# Patient Record
Sex: FEMALE | Race: BLACK OR AFRICAN AMERICAN | Employment: FULL TIME | URBAN - METROPOLITAN AREA
[De-identification: names, ages, dates, MRNs, and addresses within clinical notes are randomized per-mention and may not be internally consistent; named-entity substitution may affect disease eponyms.]

---

## 2017-10-09 ENCOUNTER — OFFICE VISIT (OUTPATIENT)
Dept: PRIMARY CARE CLINIC | Age: 22
End: 2017-10-09

## 2017-10-09 VITALS
WEIGHT: 158 LBS | HEIGHT: 68 IN | SYSTOLIC BLOOD PRESSURE: 109 MMHG | HEART RATE: 68 BPM | BODY MASS INDEX: 23.95 KG/M2 | OXYGEN SATURATION: 99 % | DIASTOLIC BLOOD PRESSURE: 72 MMHG | TEMPERATURE: 97.5 F

## 2017-10-09 DIAGNOSIS — F41.9 ANXIETY: ICD-10-CM

## 2017-10-09 DIAGNOSIS — Z00.00 ANNUAL PHYSICAL EXAM: Primary | ICD-10-CM

## 2017-10-09 DIAGNOSIS — Z30.09 FAMILY PLANNING: ICD-10-CM

## 2017-10-09 DIAGNOSIS — Z11.4 SCREENING FOR HIV WITHOUT PRESENCE OF RISK FACTORS: ICD-10-CM

## 2017-10-09 DIAGNOSIS — L30.9 ECZEMA, UNSPECIFIED TYPE: ICD-10-CM

## 2017-10-09 PROCEDURE — 99395 PREV VISIT EST AGE 18-39: CPT | Performed by: FAMILY MEDICINE

## 2017-10-09 RX ORDER — TRIAMCINOLONE ACETONIDE 1 MG/G
CREAM TOPICAL
Qty: 45 G | Refills: 1 | Status: SHIPPED | OUTPATIENT
Start: 2017-10-09 | End: 2018-04-23

## 2017-10-09 RX ORDER — TRIAMCINOLONE ACETONIDE 5 MG/G
CREAM TOPICAL
COMMUNITY
Start: 2015-06-24 | End: 2017-10-09 | Stop reason: SDUPTHER

## 2017-10-09 ASSESSMENT — PATIENT HEALTH QUESTIONNAIRE - PHQ9
SUM OF ALL RESPONSES TO PHQ9 QUESTIONS 1 & 2: 2
2. FEELING DOWN, DEPRESSED OR HOPELESS: 1
SUM OF ALL RESPONSES TO PHQ QUESTIONS 1-9: 2
1. LITTLE INTEREST OR PLEASURE IN DOING THINGS: 1

## 2017-10-09 NOTE — PROGRESS NOTES
Social History     Social History    Marital status: Single     Spouse name: N/A    Number of children: N/A    Years of education: N/A     Occupational History    Not on file. Social History Main Topics    Smoking status: Former Smoker    Smokeless tobacco: Never Used    Alcohol use 0.0 oz/week      Comment: rarely    Drug use: No    Sexual activity: Yes     Partners: Male     Other Topics Concern    Not on file     Social History Narrative    No narrative on file       Review of Systems:  A comprehensive review of systems was negative except for what was noted in the HPI. Physical Exam:   Vitals:    10/09/17 1059   BP: 109/72   Pulse: 68   Temp: 97.5 °F (36.4 °C)   TempSrc: Oral   SpO2: 99%   Weight: 158 lb (71.7 kg)   Height: 5' 7.5\" (1.715 m)     Body mass index is 24.38 kg/m². Constitutional: She is oriented to person, place, and time. She appears well-developed and well-nourished. No distress. HEENT:   Head: Normocephalic and atraumatic. Right Ear: Tympanic membrane, external ear and ear canal normal.   Left Ear: Tympanic membrane, external ear and ear canal normal.   Nose: Nose normal.   Mouth/Throat: Oropharynx is clear and moist, and mucous membranes are normal.  There is no cervical adenopathy. Eyes: Conjunctivae and extraocular motions are normal. Pupils are equal, round, and reactive to light. Neck: Neck supple. No JVD present. Carotid bruit is not present. No mass and no thyromegaly present. Cardiovascular: Normal rate, regular rhythm, normal heart sounds and intact distal pulses. Exam reveals no gallop and no friction rub. No murmur heard. Pulmonary/Chest: Effort normal and breath sounds normal. No respiratory distress. She has no wheezes, rhonchi or rales. Abdominal: Soft, non-tender. Bowel sounds and aorta are normal. She exhibits no organomegaly, mass or bruit.    Genitourinary:per gyn  Rectal: per gyn  Breast exam:per gyn  Musculoskeletal: Normal range of motion, no synovitis. She exhibits no edema. Neurological: She is alert and oriented to person, place, and time. She has normal reflexes. No cranial nerve deficit. Coordination normal.   Skin: Skin is warm and dry. Few patches rash posterior neck areas , none anti cubital areas    Psychiatric: She has a normal mood and affect. Her speech is normal and behavior is normal. Judgment, cognition and memory are normal.       EKG Interpretation:     Lab Review: not applicable     Assessment/Plan:    Leonora was seen today for annual exam, anxiety and other. Diagnoses and all orders for this visit:    Annual physical exam    Eczema, unspecified type  restart  -     triamcinolone (KENALOG) 0.1 % cream; Apply topically 2 times daily. -     Comprehensive Metabolic Panel  -     CBC Auto Differential; Future    Anxiety wants refer for counseling  Will ck with ins for panel and ref  Ck labs  -     TSH without Reflex  -     Comprehensive Metabolic Panel  -     CBC Auto Differential; Future    Screening for HIV without presence of risk factors  -     HIV Screen;  Future    Family planning    amb ref to gyn, wants bcp

## 2018-01-15 ENCOUNTER — OFFICE VISIT (OUTPATIENT)
Dept: PRIMARY CARE CLINIC | Age: 23
End: 2018-01-15

## 2018-01-15 VITALS
OXYGEN SATURATION: 98 % | BODY MASS INDEX: 25.43 KG/M2 | HEIGHT: 67 IN | WEIGHT: 162 LBS | SYSTOLIC BLOOD PRESSURE: 119 MMHG | HEART RATE: 96 BPM | TEMPERATURE: 97.1 F | DIASTOLIC BLOOD PRESSURE: 75 MMHG

## 2018-01-15 DIAGNOSIS — R05.8 PRODUCTIVE COUGH: Primary | ICD-10-CM

## 2018-01-15 DIAGNOSIS — R68.89 FLU-LIKE SYMPTOMS: ICD-10-CM

## 2018-01-15 LAB
INFLUENZA A ANTIBODY: NORMAL
INFLUENZA B ANTIBODY: NORMAL

## 2018-01-15 PROCEDURE — 99214 OFFICE O/P EST MOD 30 MIN: CPT | Performed by: FAMILY MEDICINE

## 2018-01-15 PROCEDURE — 87804 INFLUENZA ASSAY W/OPTIC: CPT | Performed by: FAMILY MEDICINE

## 2018-01-15 RX ORDER — GUAIFENESIN/DEXTROMETHORPHAN 100-10MG/5
5 SYRUP ORAL 3 TIMES DAILY PRN
Qty: 120 ML | Refills: 0 | Status: SHIPPED | OUTPATIENT
Start: 2018-01-15 | End: 2018-01-25

## 2018-01-15 RX ORDER — AZITHROMYCIN 250 MG/1
TABLET, FILM COATED ORAL
Qty: 1 PACKET | Refills: 0 | Status: SHIPPED | OUTPATIENT
Start: 2018-01-15 | End: 2018-01-25

## 2018-01-15 ASSESSMENT — ENCOUNTER SYMPTOMS
SINUS PRESSURE: 0
CHEST TIGHTNESS: 0
RECTAL PAIN: 0
SWOLLEN GLANDS: 0
TROUBLE SWALLOWING: 0
COUGH: 1
EYE DISCHARGE: 0
BLOOD IN STOOL: 0
EYE REDNESS: 0
NAUSEA: 0
VOMITING: 0
RHINORRHEA: 1
APNEA: 0
ABDOMINAL DISTENTION: 0
PHOTOPHOBIA: 0
FLU SYMPTOMS: 1
CHOKING: 0
BACK PAIN: 0
DIARRHEA: 0
WHEEZING: 1
STRIDOR: 0
EYE PAIN: 0
SORE THROAT: 1
EYE ITCHING: 0
HEMOPTYSIS: 0
COLOR CHANGE: 0
CONSTIPATION: 0
SHORTNESS OF BREATH: 0
VISUAL CHANGE: 0

## 2018-04-23 ENCOUNTER — OFFICE VISIT (OUTPATIENT)
Dept: PRIMARY CARE CLINIC | Age: 23
End: 2018-04-23

## 2018-04-23 VITALS
SYSTOLIC BLOOD PRESSURE: 108 MMHG | OXYGEN SATURATION: 99 % | TEMPERATURE: 97.8 F | HEIGHT: 67 IN | WEIGHT: 153 LBS | DIASTOLIC BLOOD PRESSURE: 66 MMHG | HEART RATE: 85 BPM | BODY MASS INDEX: 24.01 KG/M2

## 2018-04-23 DIAGNOSIS — F32.2 SEVERE SINGLE CURRENT EPISODE OF MAJOR DEPRESSIVE DISORDER, WITHOUT PSYCHOTIC FEATURES (HCC): ICD-10-CM

## 2018-04-23 DIAGNOSIS — R63.4 WEIGHT LOSS: Primary | ICD-10-CM

## 2018-04-23 LAB
AMPHETAMINE SCREEN, URINE: ABNORMAL
BARBITURATE SCREEN, URINE: ABNORMAL
BENZODIAZEPINE SCREEN, URINE: ABNORMAL
COCAINE METABOLITE SCREEN URINE: ABNORMAL
MDMA URINE: ABNORMAL
METHADONE SCREEN, URINE: ABNORMAL
METHAMPHETAMINE, URINE: ABNORMAL
OPIATE SCREEN URINE: ABNORMAL
OXYCODONE SCREEN URINE: ABNORMAL
PHENCYCLIDINE SCREEN URINE: ABNORMAL
PROPOXYPHENE SCREEN, URINE: ABNORMAL
THC: POSITIVE
TRICYCLIC ANTIDEPRESSANTS, UR: ABNORMAL

## 2018-04-23 PROCEDURE — 80305 DRUG TEST PRSMV DIR OPT OBS: CPT | Performed by: FAMILY MEDICINE

## 2018-04-23 PROCEDURE — 99214 OFFICE O/P EST MOD 30 MIN: CPT | Performed by: FAMILY MEDICINE

## 2018-04-23 RX ORDER — TRAZODONE HYDROCHLORIDE 50 MG/1
50 TABLET ORAL NIGHTLY
Qty: 30 TABLET | Refills: 3 | Status: SHIPPED | OUTPATIENT
Start: 2018-04-23 | End: 2018-08-31 | Stop reason: SDUPTHER

## 2018-04-23 RX ORDER — ESCITALOPRAM OXALATE 10 MG/1
10 TABLET ORAL DAILY
Qty: 30 TABLET | Refills: 3 | Status: SHIPPED | OUTPATIENT
Start: 2018-04-23 | End: 2018-05-11 | Stop reason: DRUGHIGH

## 2018-04-23 RX ORDER — ZOLPIDEM TARTRATE 5 MG/1
5 TABLET ORAL NIGHTLY PRN
Qty: 14 TABLET | Refills: 0 | Status: SHIPPED | OUTPATIENT
Start: 2018-04-23 | End: 2018-04-23 | Stop reason: CLARIF

## 2018-04-23 ASSESSMENT — ENCOUNTER SYMPTOMS
RECTAL PAIN: 0
CONSTIPATION: 0
COUGH: 0
EYE ITCHING: 0
SORE THROAT: 0
CHOKING: 0
BACK PAIN: 0
ABDOMINAL DISTENTION: 0
APNEA: 0
SHORTNESS OF BREATH: 0
EYE PAIN: 0
WHEEZING: 0
DIARRHEA: 0
CHEST TIGHTNESS: 0
COLOR CHANGE: 0
RHINORRHEA: 0
EYE REDNESS: 0
BLOOD IN STOOL: 0
SINUS PRESSURE: 0
EYE DISCHARGE: 0
TROUBLE SWALLOWING: 0
NAUSEA: 0
VOMITING: 0
STRIDOR: 0
PHOTOPHOBIA: 0

## 2018-05-11 ENCOUNTER — TELEPHONE (OUTPATIENT)
Dept: PRIMARY CARE CLINIC | Age: 23
End: 2018-05-11

## 2018-05-11 DIAGNOSIS — F32.2 SEVERE SINGLE CURRENT EPISODE OF MAJOR DEPRESSIVE DISORDER, WITHOUT PSYCHOTIC FEATURES (HCC): Primary | ICD-10-CM

## 2018-05-11 RX ORDER — ESCITALOPRAM OXALATE 20 MG/1
20 TABLET ORAL DAILY
Qty: 30 TABLET | Refills: 3 | Status: SHIPPED | OUTPATIENT
Start: 2018-05-11 | End: 2018-07-06 | Stop reason: SDUPTHER

## 2018-07-06 ENCOUNTER — OFFICE VISIT (OUTPATIENT)
Dept: PSYCHIATRY | Age: 23
End: 2018-07-06

## 2018-07-06 VITALS
RESPIRATION RATE: 18 BRPM | DIASTOLIC BLOOD PRESSURE: 64 MMHG | WEIGHT: 152.4 LBS | HEIGHT: 67 IN | SYSTOLIC BLOOD PRESSURE: 103 MMHG | HEART RATE: 77 BPM | BODY MASS INDEX: 23.92 KG/M2 | OXYGEN SATURATION: 98 %

## 2018-07-06 DIAGNOSIS — F32.2 SEVERE SINGLE CURRENT EPISODE OF MAJOR DEPRESSIVE DISORDER, WITHOUT PSYCHOTIC FEATURES (HCC): ICD-10-CM

## 2018-07-06 DIAGNOSIS — R63.4 WEIGHT LOSS: ICD-10-CM

## 2018-07-06 DIAGNOSIS — F39 MOOD DISORDER (HCC): ICD-10-CM

## 2018-07-06 DIAGNOSIS — F39 MOOD DISORDER (HCC): Primary | ICD-10-CM

## 2018-07-06 DIAGNOSIS — F41.1 GAD (GENERALIZED ANXIETY DISORDER): ICD-10-CM

## 2018-07-06 LAB
A/G RATIO: 1.4 (ref 1.1–2.2)
ALBUMIN SERPL-MCNC: 4.3 G/DL (ref 3.4–5)
ALP BLD-CCNC: 54 U/L (ref 40–129)
ALT SERPL-CCNC: 15 U/L (ref 10–40)
ANION GAP SERPL CALCULATED.3IONS-SCNC: 12 MMOL/L (ref 3–16)
AST SERPL-CCNC: 16 U/L (ref 15–37)
BASOPHILS ABSOLUTE: 0 K/UL (ref 0–0.2)
BASOPHILS RELATIVE PERCENT: 0.4 %
BILIRUB SERPL-MCNC: 0.3 MG/DL (ref 0–1)
BILIRUBIN URINE: NEGATIVE
BLOOD, URINE: NEGATIVE
BUN BLDV-MCNC: 11 MG/DL (ref 7–20)
CALCIUM SERPL-MCNC: 9.5 MG/DL (ref 8.3–10.6)
CHLORIDE BLD-SCNC: 103 MMOL/L (ref 99–110)
CHOLESTEROL, TOTAL: 140 MG/DL (ref 0–199)
CLARITY: CLEAR
CO2: 26 MMOL/L (ref 21–32)
COLOR: NORMAL
CREAT SERPL-MCNC: 0.6 MG/DL (ref 0.6–1.1)
EOSINOPHILS ABSOLUTE: 0.2 K/UL (ref 0–0.6)
EOSINOPHILS RELATIVE PERCENT: 3.2 %
GFR AFRICAN AMERICAN: >60
GFR NON-AFRICAN AMERICAN: >60
GLOBULIN: 3 G/DL
GLUCOSE BLD-MCNC: 80 MG/DL (ref 70–99)
GLUCOSE URINE: NEGATIVE MG/DL
HCT VFR BLD CALC: 38.9 % (ref 36–48)
HDLC SERPL-MCNC: 65 MG/DL (ref 40–60)
HEMOGLOBIN: 13.5 G/DL (ref 12–16)
KETONES, URINE: NEGATIVE MG/DL
LDL CHOLESTEROL CALCULATED: 66 MG/DL
LEUKOCYTE ESTERASE, URINE: NEGATIVE
LYMPHOCYTES ABSOLUTE: 1.6 K/UL (ref 1–5.1)
LYMPHOCYTES RELATIVE PERCENT: 31.3 %
MCH RBC QN AUTO: 30.1 PG (ref 26–34)
MCHC RBC AUTO-ENTMCNC: 34.6 G/DL (ref 31–36)
MCV RBC AUTO: 86.8 FL (ref 80–100)
MICROSCOPIC EXAMINATION: NORMAL
MONOCYTES ABSOLUTE: 0.4 K/UL (ref 0–1.3)
MONOCYTES RELATIVE PERCENT: 8.4 %
NEUTROPHILS ABSOLUTE: 2.9 K/UL (ref 1.7–7.7)
NEUTROPHILS RELATIVE PERCENT: 56.7 %
NITRITE, URINE: NEGATIVE
PDW BLD-RTO: 13.4 % (ref 12.4–15.4)
PH UA: 6
PLATELET # BLD: 218 K/UL (ref 135–450)
PMV BLD AUTO: 8 FL (ref 5–10.5)
POTASSIUM SERPL-SCNC: 4.6 MMOL/L (ref 3.5–5.1)
PREALBUMIN: 22.4 MG/DL (ref 20–40)
PROTEIN UA: NEGATIVE MG/DL
RBC # BLD: 4.48 M/UL (ref 4–5.2)
SODIUM BLD-SCNC: 141 MMOL/L (ref 136–145)
SPECIFIC GRAVITY UA: 1.02
TOTAL PROTEIN: 7.3 G/DL (ref 6.4–8.2)
TRIGL SERPL-MCNC: 46 MG/DL (ref 0–150)
TSH REFLEX FT4: 0.93 UIU/ML (ref 0.27–4.2)
TSH SERPL DL<=0.05 MIU/L-ACNC: 0.86 UIU/ML (ref 0.27–4.2)
URINE TYPE: NORMAL
UROBILINOGEN, URINE: 0.2 E.U./DL
VITAMIN D 25-HYDROXY: 22.2 NG/ML
VLDLC SERPL CALC-MCNC: 9 MG/DL
WBC # BLD: 5.1 K/UL (ref 4–11)

## 2018-07-06 PROCEDURE — 99204 OFFICE O/P NEW MOD 45 MIN: CPT | Performed by: NURSE PRACTITIONER

## 2018-07-06 RX ORDER — ARIPIPRAZOLE 5 MG/1
5 TABLET ORAL DAILY
Qty: 30 TABLET | Refills: 1 | Status: SHIPPED | OUTPATIENT
Start: 2018-07-06 | End: 2018-08-17

## 2018-07-06 RX ORDER — HYDROXYZINE HYDROCHLORIDE 25 MG/1
25 TABLET, FILM COATED ORAL 3 TIMES DAILY PRN
Qty: 60 TABLET | Refills: 2 | Status: SHIPPED | OUTPATIENT
Start: 2018-07-06 | End: 2018-08-05

## 2018-07-06 RX ORDER — ESCITALOPRAM OXALATE 20 MG/1
20 TABLET ORAL DAILY
Qty: 30 TABLET | Refills: 3 | Status: SHIPPED | OUTPATIENT
Start: 2018-07-06 | End: 2021-04-23 | Stop reason: ALTCHOICE

## 2018-07-06 ASSESSMENT — PATIENT HEALTH QUESTIONNAIRE - PHQ9
SUM OF ALL RESPONSES TO PHQ9 QUESTIONS 1 & 2: 4
1. LITTLE INTEREST OR PLEASURE IN DOING THINGS: 2
10. IF YOU CHECKED OFF ANY PROBLEMS, HOW DIFFICULT HAVE THESE PROBLEMS MADE IT FOR YOU TO DO YOUR WORK, TAKE CARE OF THINGS AT HOME, OR GET ALONG WITH OTHER PEOPLE: 3
8. MOVING OR SPEAKING SO SLOWLY THAT OTHER PEOPLE COULD HAVE NOTICED. OR THE OPPOSITE, BEING SO FIGETY OR RESTLESS THAT YOU HAVE BEEN MOVING AROUND A LOT MORE THAN USUAL: 0
4. FEELING TIRED OR HAVING LITTLE ENERGY: 3
9. THOUGHTS THAT YOU WOULD BE BETTER OFF DEAD, OR OF HURTING YOURSELF: 0
2. FEELING DOWN, DEPRESSED OR HOPELESS: 2
6. FEELING BAD ABOUT YOURSELF - OR THAT YOU ARE A FAILURE OR HAVE LET YOURSELF OR YOUR FAMILY DOWN: 2
7. TROUBLE CONCENTRATING ON THINGS, SUCH AS READING THE NEWSPAPER OR WATCHING TELEVISION: 2
SUM OF ALL RESPONSES TO PHQ QUESTIONS 1-9: 15
5. POOR APPETITE OR OVEREATING: 3
3. TROUBLE FALLING OR STAYING ASLEEP: 1

## 2018-07-06 ASSESSMENT — ANXIETY QUESTIONNAIRES
1. FEELING NERVOUS, ANXIOUS, OR ON EDGE: 3-NEARLY EVERY DAY
7. FEELING AFRAID AS IF SOMETHING AWFUL MIGHT HAPPEN: 3-NEARLY EVERY DAY
2. NOT BEING ABLE TO STOP OR CONTROL WORRYING: 3-NEARLY EVERY DAY
4. TROUBLE RELAXING: 3-NEARLY EVERY DAY
6. BECOMING EASILY ANNOYED OR IRRITABLE: 3-NEARLY EVERY DAY
GAD7 TOTAL SCORE: 19
5. BEING SO RESTLESS THAT IT IS HARD TO SIT STILL: 1-SEVERAL DAYS
3. WORRYING TOO MUCH ABOUT DIFFERENT THINGS: 3-NEARLY EVERY DAY

## 2018-07-06 NOTE — PROGRESS NOTES
PSYCHIATRY INITIAL EVALUATION/DIAGNOSTIC ASSESSMENT    Svetlana Hoover  1995 07/06/18    Face to Face time:90 mins  CC:   Chief Complaint   Patient presents with    Depression     REFERRED BY DR Mahsa Sultana       HPI:   Svetlana Hoover is a 21 y.o. female with h/o mood lability, anxiety, panic and ocd sx who p/t clinic to establish care with this provider. Referred by Dennise Shell MD.     Struggled with mood lability and anxiety \"my whole life\"  Began to have negative impact on daily functioning 2 years ago. Triggered by multiple deaths in the family. One of those being grandmother who was positive influence in life. Saw her more as mother and \"best friend\". Watched her die of cancer, helped care for her in final days. \"took a toll on me. \"  One of best friends was shot/killed by police office while leaving party patient was supposed to have attended. Additional stressors include first real relationship that he ended 1/2018, losing virginity at 24; angry at self for this, had planned to wait until ; Work stress, training to become manager. Felt racially profiled. Struggling with being , struggles to know where to fit in. Conflict with mom who has own poorly controlled mental health issues and not handling multiple family deaths well. No prior hospitalizations or suicide attempts. PCP started lexapro. Has noted partial response.   Still feels has \"no control over my emotions\" and \"always worrying\"    Psych ROS:     Depression: decreased sleep (was waking every 2 hours, improved with trazodone), change in appetite (variable, lost 70# in past few years), decreased concentration, guilt, hopelessness, helplessness, poor self esteem, difficulty with ADL completion, loss of interest, poor energy, tearful, increased isolation,     Elizabeth: intermittent episodes lasting approx 24 hours:  insomnia with increased energy, rapid speech, irritability,  expansive mood, increase in energy and goal directed behavior,     Anxiety: constant worry \"everything\", (work, family, accidents, what people think about her, is \"people pleaser\"), irritability, sleep disruption, somatic complaints (headaches, trembling, nausea, dyspnea, diaphoreis, muscle tension), restlessness, fatigue; fear of doing/saying wrong things, fear of judgement, avoidant of social situations    OCD:  Repetitive actions or rituals, contamination fears, need for symmetry,  mental or verbal repetition of words or phrases and counting steps    Panic:  Intermittent episdoes Shortness of breath, diaphoresis, hot flashes, crying; always triggered by precipitating stressful event, can last hour    Psychosis: paranoia related to anxiety, thinks people want to have negative impact on her or dislike her; DENIES A/VH, delusions      PTSD: (r/t caring for dying grandmother):  nightmares, flashbacks, reliving the event,     Eating disorders:  Denies prior dx. Decided to focus on health in 2014 d/t grandmother's cancer diagnosis. Lost 70# over past few years, stopped caring about many things after grandmother's death, often would only eat once/day. Highest weight 236# 2013; lowest 145#. Feels best around 150#. No longer focused on weight but doesn't want to gain weight. Not actively attempting weight loss. Eats inconsistently. Was weighing self daily, no longer. Has \"good\" body image now. Denies purging, laxatives, diuretics or diet pills. Denies excessive exercise          MICHAEL 7 SCORE 7/6/2018   MICHAEL-7 Total Score 19     Interpretation of MICHAEL-7 score: 5-9 = mild anxiety, 10-14 = moderate anxiety, 15+ = severe anxiety. Recommend referral to behavioral health for scores 10 or greater.     PHQ-9 Total Score: 15 (7/6/2018  1:38 PM)  Interpretation of PHQ-9 score:  1-4 = minimal depression, 5-9 = mild depression, 10-14 = moderate depression; 15-19 = moderately severe depression, 20-27 = severe depression    History obtained from patient and chart denies completion; mom attempted suicide when pt 15years old    Allergies: No Known Allergies      Current Medications:     Current Outpatient Prescriptions on File Prior to Visit   Medication Sig Dispense Refill    traZODone (DESYREL) 50 MG tablet Take 1 tablet by mouth nightly 30 tablet 3     No current facility-administered medications on file prior to visit. Controlled substances monitoring: none on file. OBJECTIVE:  Vitals:   Vitals:    07/06/18 1334   BP: 103/64   Pulse: 77   Resp: 18   SpO2: 98%     Wt Readings from Last 3 Encounters:   07/06/18 152 lb 6.4 oz (69.1 kg)   04/23/18 153 lb (69.4 kg)   01/15/18 162 lb (73.5 kg)           ROS: Denies trouble with fever, rash, headache, vision changes, chest pain, shortness of breath, nausea, extremity pain, weakness, dysuria.      Mental Status Exam:     Appearance    alert, cooperative, appropriate dress for season, well groomed, appears stated age  Muscle strength/tone: no atrophy or abnormal movements  Gait/station: normal  Speech    spontaneous, normal rate and normal volume  Mood    Anxious  Depressed  Shame  Humiliation  Irritability  Emptiness  Demoralization  Affect    guarded Congruent to thought content and mood  Thought Content    hopelessness, excessive preoccupations, obsessions, intrusive thoughts and cognitive distortions, no delusions voiced  Thought Process    coherent   Associations    logical connections  Perceptions: denies AH/VH, does not appear preoccupied with the internal environment  [address]  Orientation    oriented to person, place, time, and general circumstances  Memory    recent and remote memory intact  Attention/Concentration    intact  Ability to understand instructions Yes  Ability to respond meaningfully Yes  Language: [address] of knowledge/Intellect: Average  SI:   no suicidal ideation  HI: Denies HI    Labs:   Lab Review   Office Visit on 04/23/2018   Component Date Value    psychosis or cognitive dysfunction, physically healthy, compliant with recommended medications,and patient is future oriented. 2. Psychiatric  - MDD vs BAD vs adjustment d/o. Endorses some concerning sx for possible BAD and similar symptoms in mother but undiagnosed. Complicated grief following deaths of grandmother and best friend, never truly grieved or had closure. - continue lexapro 20mg/day for mood/anxiety/ocds. Notes partial response. Consider increase dose vs switch to different ssri  - augment with abilify for mood stability/anxiety/ocd sx. Start 2.5mg/day x 4 days, then increase to 5mg/day  - continue trazodone 50mg at hs for insomnia  - trial  Hydroxyzine 12.5-25mg tid prn anxiety  -Labs: reviewed in Epic, S + [facility] 4/2018; needs updated, order cbc, cmp, vit d, tsh, lipids, hgba1c (starting SGA); says not currently sexually active, no concerns for pregnancy  -recommend outpt therapy. Resources given as below  -OARRS reviewed, c/w history  -R/b/se/a d/w pt who consents. 3. Medical  -Following with Johnny Dowling MD    4. Substance   - THC 5 times/week x 3 years, low motivation to stop.  counseled    5. RTC -4  weeks    Noemy Blackburn  Psychiatric Nurse Practitioner     Counseling Services    [facility] Counseling Service   [phone] (toll free)  [phone]   [address] not accepted. Locations: [place]: [facility], [address], [place], [address]: [address], [facility], [address]Th [address], [address], [place], [address] [facility]: [address] [place], [place], [address] [facility]  [place]: [address], [place], [facility] HOSPITAL:  17[address] [facility], [address] [address]:  101 [facility] [address] [place] [address] Counseling Services  Phone: [phone]  Locations:   [facility]: [place]  [address], [facility] [address]: 10 [facility], [facility], [address]  [place]: [address] [facility]. [address], [place], [address] (adult and adolescent; substance use, dialectical behavior therapy)   HOSP [facility], [place], operated by [facility] [address] MADONNA BROWN, [facility], [place] [address], [facility]   [phone]  www. [facility]  [employer] (including Medicare but not Medicaid) and self-pay    Connected with: [facility]- PSYCHIATRY & ADDICTIVE MED  [facility]0 [address]   [phone]  [email or web address]           [address]  [phone] 281-[phone] ([phone])    National Suicide Prevention Lifeline  (268) 713-TALK (1300)  www.suicidepreventionlifeline. [facility] Pupa of [address] ([place]): [address]  [address]      Text Support  Text 199491 \"connect\" if suicidal for contact via text or phone call

## 2018-07-06 NOTE — PATIENT INSTRUCTIONS
1.  Take abilify 5mg 1/2 tab by mouth for 4 days, then increase to whole tablet daily  2. Continue lexapro 20mg/day  3. Continue trazodone 50mg at bedtime as needed for sleep  4. Try hydroxyzine 1/2 to 1 tab three times daily as needed for anxiety      Counseling Services    [facility] Counseling Service   [phone] (toll free)  [address] [facility] not accepted. Locations: [place]: [facility], [address], [place], [address]: [facility] [facility], [facility], [address]Th [address], [address], [place], [address] [facility] [facility]  [address]: 20 [facility]. [address], [facility], 83 [facility]  [address]: [address], [place], [facility]:  17[address] [facility], [address] [facility] [address]:  101 [facility] [address] [place] [address] Counseling Services  Phone: [phone]  Locations:   [place]: [facility]. [address], [address] [facility] [address] [facility] [address]: 10 [facility], [address]  [address] [address]: [id number] [place], [place] [address]andreina Northd , Sonja [address]le[address]. [address], [address] [facility] [address], [address]    [address] (adult and adolescent; substance use, dialectical behavior therapy)   Jenny Pichardo Jodine Rakers, [facility]   [phone]  [email or web address]  [employer] (including Medicare but not Medicaid) and self-pay    Connected with: The [facility]- PSYCHIATRY & ADDICTIVE MED  [address] [facility] [address] [facility] [address]   [phone]  [email or web address]           [address]  [phone] 281-CARE ([phone])    National Suicide Prevention Lifeline  (483) 312-TALK (8000)  www.suicidepreventionlifeline. Elham Quiroz of [address] [place] [address]): [address]  [address]      Text Support  Text 250228 \"connect\" if suicidal for contact via text or phone call

## 2018-07-07 LAB
ESTIMATED AVERAGE GLUCOSE: 93.9 MG/DL
HBA1C MFR BLD: 4.9 %

## 2018-08-17 ENCOUNTER — OFFICE VISIT (OUTPATIENT)
Dept: PSYCHIATRY | Age: 23
End: 2018-08-17

## 2018-08-17 VITALS
SYSTOLIC BLOOD PRESSURE: 108 MMHG | DIASTOLIC BLOOD PRESSURE: 64 MMHG | HEART RATE: 69 BPM | BODY MASS INDEX: 24.81 KG/M2 | WEIGHT: 158.4 LBS

## 2018-08-17 DIAGNOSIS — F41.1 GAD (GENERALIZED ANXIETY DISORDER): Primary | ICD-10-CM

## 2018-08-17 DIAGNOSIS — F39 MOOD DISORDER (HCC): ICD-10-CM

## 2018-08-17 PROCEDURE — 99214 OFFICE O/P EST MOD 30 MIN: CPT | Performed by: NURSE PRACTITIONER

## 2018-08-17 RX ORDER — HYDROXYZINE HYDROCHLORIDE 25 MG/1
25 TABLET, FILM COATED ORAL 3 TIMES DAILY PRN
COMMUNITY
End: 2021-04-23

## 2018-08-17 RX ORDER — LORAZEPAM 0.5 MG/1
0.5 TABLET ORAL 2 TIMES DAILY PRN
Qty: 60 TABLET | Refills: 0 | Status: SHIPPED | OUTPATIENT
Start: 2018-08-17 | End: 2018-09-16

## 2018-08-17 RX ORDER — QUETIAPINE FUMARATE 50 MG/1
50 TABLET, FILM COATED ORAL NIGHTLY
Qty: 30 TABLET | Refills: 1 | Status: SHIPPED | OUTPATIENT
Start: 2018-08-17 | End: 2021-04-23 | Stop reason: ALTCHOICE

## 2018-08-17 ASSESSMENT — PATIENT HEALTH QUESTIONNAIRE - PHQ9
8. MOVING OR SPEAKING SO SLOWLY THAT OTHER PEOPLE COULD HAVE NOTICED. OR THE OPPOSITE, BEING SO FIGETY OR RESTLESS THAT YOU HAVE BEEN MOVING AROUND A LOT MORE THAN USUAL: 3
4. FEELING TIRED OR HAVING LITTLE ENERGY: 3
3. TROUBLE FALLING OR STAYING ASLEEP: 0
SUM OF ALL RESPONSES TO PHQ QUESTIONS 1-9: 16
SUM OF ALL RESPONSES TO PHQ9 QUESTIONS 1 & 2: 3
7. TROUBLE CONCENTRATING ON THINGS, SUCH AS READING THE NEWSPAPER OR WATCHING TELEVISION: 3
10. IF YOU CHECKED OFF ANY PROBLEMS, HOW DIFFICULT HAVE THESE PROBLEMS MADE IT FOR YOU TO DO YOUR WORK, TAKE CARE OF THINGS AT HOME, OR GET ALONG WITH OTHER PEOPLE: 2
SUM OF ALL RESPONSES TO PHQ QUESTIONS 1-9: 16
2. FEELING DOWN, DEPRESSED OR HOPELESS: 1
6. FEELING BAD ABOUT YOURSELF - OR THAT YOU ARE A FAILURE OR HAVE LET YOURSELF OR YOUR FAMILY DOWN: 1
1. LITTLE INTEREST OR PLEASURE IN DOING THINGS: 2
9. THOUGHTS THAT YOU WOULD BE BETTER OFF DEAD, OR OF HURTING YOURSELF: 0
5. POOR APPETITE OR OVEREATING: 3

## 2018-08-17 ASSESSMENT — ANXIETY QUESTIONNAIRES
3. WORRYING TOO MUCH ABOUT DIFFERENT THINGS: 3-NEARLY EVERY DAY
1. FEELING NERVOUS, ANXIOUS, OR ON EDGE: 3-NEARLY EVERY DAY
2. NOT BEING ABLE TO STOP OR CONTROL WORRYING: 3-NEARLY EVERY DAY
6. BECOMING EASILY ANNOYED OR IRRITABLE: 3-NEARLY EVERY DAY
5. BEING SO RESTLESS THAT IT IS HARD TO SIT STILL: 3-NEARLY EVERY DAY
7. FEELING AFRAID AS IF SOMETHING AWFUL MIGHT HAPPEN: 3-NEARLY EVERY DAY
GAD7 TOTAL SCORE: 21
4. TROUBLE RELAXING: 3-NEARLY EVERY DAY

## 2018-08-17 NOTE — PROGRESS NOTES
PSYCHIATRY PROGRESS NOTE    Serafin Warren  1995 08/17/18    Face to Face time:35 mins  CC:   Chief Complaint   Patient presents with    1 Month Follow-Up     Struggled with mood lability and anxiety \"my whole life\"  Began to have negative impact on daily functioning 2 years ago. Triggered by multiple deaths in the family. HPI:   Serafin Warren is a 21 y.o. female with h/o mood lability, anxiety, panic and ocd sx who p/t clinic for follow up. Since initial eval 7/6/18, took abilify for 3-4 weeks. Made irritability and anxiety worse. Stopped taking 2 weeks ago. No change in sx    Stressors: Moved into new apartment by self. 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 Does note feeling happier, more motivated and able to get things done but frequent mood swings. Irritability. Increased since abilify. Feels rage, particularly when driving. HI thoughts, could never act on it. Denies target, plan or weapons. Sleep struggle. Back to waking every hour or so. Low energy. Wakes feeling anxious. Forgetful, loses things. Worse if poor sleep night before. Mood somewhat improved, eating more consistent meals. Isolative. Hard to get through work. Min effort in ADL completion, often skips brushing teeth    Anxiety: worse. Constant mind racing. Unable to relax. Some days avoidant, some days wants to be social.  [place] April' tolerate hydroxyzine at work, makes too tired. No major improvement with anxiety. + acid reflux and clenches jaw (not r/t prior abilify, is r/t anxiety), jittery, thinks all r/t anxiety    Couple panic attacks    Safety:  Denies sh/si/avh.  + hi without plan/intent/target. Says \"could never act on it\". Mostly when driving      Substance use:  etoh every 2 weeks, 4 shots; THC 4-5 times/week to relax. Denies tobacco    Thearpy:  Not yet started. Was busy with recent move.   Plans to try to schedule this substances monitoring: OARRS report reviewed today- no controlled subs on file. OBJECTIVE:  Vitals:    Wt Readings from Last 3 Encounters:   08/17/18 158 lb 6.4 oz (71.8 kg)   07/06/18 152 lb 6.4 oz (69.1 kg)   04/23/18 153 lb (69.4 kg)       Vitals:    08/17/18 1008   BP: 108/64   Pulse: 69   Weight: 158 lb 6.4 oz (71.8 kg)           ROS: Denies trouble with fever, rash, headache, vision changes, chest pain, shortness of breath, nausea, extremity pain, weakness, dysuria. +gerd daily, clenching jaw, fidgety    Mental Status Exam:      Appearance    alert, cooperative, appropriate dress for season, well groomed, appears stated age  Muscle strength/tone: no atrophy or abnormal movements  Gait/station: normal  Speech    spontaneous, normal rate and normal volume  Mood    Anxious  Depressed  Shame  Humiliation  Irritability  Emptiness  Demoralization  Affect    guarded Congruent to thought content and mood  Thought Content    hopelessness, excessive preoccupations, obsessions, intrusive thoughts and cognitive distortions, no delusions voiced  Thought Process    coherent   Associations    logical connections  Perceptions: denies AH/VH, does not appear preoccupied with the internal environment  [address]  Orientation    oriented to person, place, time, and general circumstances  Memory    recent and remote memory intact  Attention/Concentration    intact  Ability to understand instructions Yes  Ability to respond meaningfully Yes  Language: [address] of knowledge/Intellect: Average  SI:   no suicidal ideation  HI:+, denies plan/intent/target/weapons      Labs:     Orders Only on 07/06/2018   Component Date Value    Hemoglobin A1C 07/06/2018 4.9     eAG 07/06/2018 93.9     Cholesterol, Total 07/06/2018 140     Triglycerides 07/06/2018 46     HDL 07/06/2018 65*    LDL Calculated 07/06/2018 66     VLDL Cholesterol Calcula* 07/06/2018 9     TSH Reflex FT4 07/06/2018 0.93     Vit D, 25-Hydroxy 07/06/2018 22.2*   Orders Only on 07/06/2018   Component Date Value    TSH 07/06/2018 0.86     Sodium 07/06/2018 141     Potassium 07/06/2018 4.6     Chloride 07/06/2018 103     CO2 07/06/2018 26     Anion Gap 07/06/2018 12     Glucose 07/06/2018 80     BUN 07/06/2018 11     CREATININE 07/06/2018 0.6     GFR Non- 07/06/2018 >60     GFR  07/06/2018 >60     Calcium 07/06/2018 9.5     Total Protein 07/06/2018 7.3     Alb 07/06/2018 4.3     Albumin/Globulin Ratio 07/06/2018 1.4     Total Bilirubin 07/06/2018 0.3     Alkaline Phosphatase 07/06/2018 54     ALT 07/06/2018 15     AST 07/06/2018 16     Globulin 07/06/2018 3.0     Color, UA 07/06/2018 DK YELLOW     Clarity, UA 07/06/2018 Clear     Glucose, Ur 07/06/2018 Negative     Bilirubin Urine 07/06/2018 Negative     Ketones, Urine 07/06/2018 Negative     Specific Gravity, UA 07/06/2018 1.024     Blood, Urine 07/06/2018 Negative     pH, UA 07/06/2018 6.0     Protein, UA 07/06/2018 Negative     Urobilinogen, Urine 07/06/2018 0.2     Nitrite, Urine 07/06/2018 Negative     Leukocyte Esterase, Urine 07/06/2018 Negative     Microscopic Examination 07/06/2018 Not Indicated     Urine Type 07/06/2018 Clean catch     WBC 07/06/2018 5.1     RBC 07/06/2018 4.48     Hemoglobin 07/06/2018 13.5     Hematocrit 07/06/2018 38.9     MCV 07/06/2018 86.8     MCH 07/06/2018 30.1     MCHC 07/06/2018 34.6     RDW 07/06/2018 13.4     Platelets 40/77/8990 218     MPV 07/06/2018 8.0     Neutrophils % 07/06/2018 56.7     Lymphocytes % 07/06/2018 31.3     Monocytes % 07/06/2018 8.4     Eosinophils % 07/06/2018 3.2     Basophils % 07/06/2018 0.4     Neutrophils # 07/06/2018 2.9     Lymphocytes # 07/06/2018 1.6     Monocytes # 07/06/2018 0.4     Eosinophils # 07/06/2018 0.2     Basophils # 07/06/2018 0.0     Prealbumin 07/06/2018 22.4    Office Visit on 04/23/2018   Component Date Value    Amphetamine Screen, Urine 04/23/2018 neg     Barbiturate Screen, Urine 04/23/2018 neg     Benzodiazepine Screen, U* 04/23/2018 neg     Cocaine Metabolite Scree* 04/23/2018 neg     THC 04/23/2018 Positive     MDMA, Urine 04/23/2018 neg     Methadone Screen, Urine 04/23/2018 neg     Opiate Scrn, Ur 04/23/2018 neg     Oxycodone Screen, Ur 04/23/2018 neg     PCP Screen, Urine 04/23/2018 neg     Propoxyphene Screen, Uri* 48/71/0690 neg     Tricyclic Antidepressant* 30/42/6184 neg     Methamphetamine, Urine 04/23/2018 neg        Last Drug screen:  Lab Results   Component Value Date    LABAMPH neg 04/23/2018    LABBARB neg 04/23/2018    LABBENZ neg 04/23/2018    COCAIMETSCRU neg 04/23/2018    THC Positive 04/23/2018    MDMA neg 04/23/2018    LABMETH neg 04/23/2018    OPIATESCREENURINE neg 04/23/2018    OXTCOSU neg 04/23/2018    PHENCYCLIDINESCREENURINE neg 04/23/2018    PROPOXYPHENE neg 04/23/2018    METAMPU neg 04/23/2018           Imaging: no head imaging on file        ASSESSMENT AND PLAN     Diagnosis Orders   1. MICHAEL (generalized anxiety disorder)  LORazepam (ATIVAN) 0.5 MG tablet    QUEtiapine (SEROQUEL) 50 MG tablet   2. Mood disorder ([address]     3. R/o ocd  4. R/o ptsd        1. Safety: NO Imminent risk of danger to/self/others based on the factors considered below. Appropriate for outpatient level of care. Safety plan includes: 911, PES, hotlines, and interventions discussed today. Risk factors: Age <25 or >55,  depressed mood,  substance abuse, social isolation,  no outpatient services in place,  and no collateral information to support safety. Protective factors: A female gender,  denies suicidal ideation, does not have lethal plan, does not have access to guns or weapons, patient is wili for safety, no prior suicide attempts, no family h/o suicide, no active psychosis or cognitive dysfunction, physically healthy, compliant with recommended medications,and patient is future oriented.        2.

## 2018-08-30 ENCOUNTER — TELEPHONE (OUTPATIENT)
Dept: PRIMARY CARE CLINIC | Age: 23
End: 2018-08-30

## 2018-08-31 DIAGNOSIS — F32.2 SEVERE SINGLE CURRENT EPISODE OF MAJOR DEPRESSIVE DISORDER, WITHOUT PSYCHOTIC FEATURES (HCC): ICD-10-CM

## 2018-08-31 DIAGNOSIS — R63.4 WEIGHT LOSS: ICD-10-CM

## 2018-08-31 RX ORDER — TRAZODONE HYDROCHLORIDE 50 MG/1
TABLET ORAL
Qty: 30 TABLET | Refills: 0 | Status: SHIPPED | OUTPATIENT
Start: 2018-08-31 | End: 2018-09-28 | Stop reason: SDUPTHER

## 2018-08-31 NOTE — TELEPHONE ENCOUNTER
Blenda Goodpasture called concerning a prescription for Ativan. She stated that her pharmacy, [facility], had a question regarding how the order was written and for how many mgm's.           [facility]  [phone]

## 2018-09-04 NOTE — TELEPHONE ENCOUNTER
LORazepam (ATIVAN) 0.5 MG tablet 60 tablet 0 8/17/2018 9/16/2018    Sig - Route: Take 1 tablet by mouth 2 times daily as needed for Anxiety for up to 30 days. . - Oral    Sent to pharmacy as: LORazepam 0.5 MG Oral Tablet    E-Prescribing Status: Receipt confirmed by pharmacy (8/17/2018 10:34 AM EDT)      See above for original order. Appears was received by pharmacy on 8/17/18?

## 2018-09-04 NOTE — TELEPHONE ENCOUNTER
I called and spoke with pharmacy  They are out of the 0.5mg tablets so we changed it to the 1mg and cut the quantity in half  Pt is aware of this change

## 2018-09-28 DIAGNOSIS — F32.2 SEVERE SINGLE CURRENT EPISODE OF MAJOR DEPRESSIVE DISORDER, WITHOUT PSYCHOTIC FEATURES (HCC): ICD-10-CM

## 2018-09-28 DIAGNOSIS — R63.4 WEIGHT LOSS: ICD-10-CM

## 2018-09-28 NOTE — TELEPHONE ENCOUNTER
Requested Prescriptions     Pending Prescriptions Disp Refills    traZODone (DESYREL) 50 MG tablet [Pharmacy Med Name: TRAZODONE 50 MG TABLET] 30 tablet 0     Sig: TAKE 1 TABLET BY MOUTH AT BEDTIME     4/23/18

## 2018-09-29 RX ORDER — TRAZODONE HYDROCHLORIDE 50 MG/1
TABLET ORAL
Qty: 30 TABLET | Refills: 0 | Status: SHIPPED | OUTPATIENT
Start: 2018-09-29 | End: 2021-04-23 | Stop reason: ALTCHOICE

## 2018-11-21 RX ORDER — LORAZEPAM 1 MG/1
TABLET ORAL
Qty: 30 TABLET | Refills: 0 | OUTPATIENT
Start: 2018-11-21

## 2019-06-10 ENCOUNTER — OFFICE VISIT (OUTPATIENT)
Dept: PRIMARY CARE CLINIC | Age: 24
End: 2019-06-10
Payer: COMMERCIAL

## 2019-06-10 VITALS
DIASTOLIC BLOOD PRESSURE: 59 MMHG | HEART RATE: 76 BPM | WEIGHT: 172.4 LBS | SYSTOLIC BLOOD PRESSURE: 108 MMHG | BODY MASS INDEX: 27.06 KG/M2 | HEIGHT: 67 IN | OXYGEN SATURATION: 99 %

## 2019-06-10 DIAGNOSIS — R10.30 LOWER ABDOMINAL PAIN: Primary | ICD-10-CM

## 2019-06-10 LAB
BACTERIA: ABNORMAL /HPF
BILIRUBIN URINE: NEGATIVE
BLOOD, URINE: NEGATIVE
CLARITY: ABNORMAL
COLOR: ABNORMAL
CONTROL: NORMAL
EPITHELIAL CELLS, UA: 6 /HPF (ref 0–5)
GLUCOSE URINE: NEGATIVE MG/DL
HYALINE CASTS: 9 /LPF (ref 0–8)
KETONES, URINE: ABNORMAL MG/DL
LEUKOCYTE ESTERASE, URINE: NEGATIVE
MICROSCOPIC EXAMINATION: YES
MUCUS: ABNORMAL /LPF
NITRITE, URINE: NEGATIVE
PH UA: 6 (ref 5–8)
PREGNANCY TEST URINE, POC: POSITIVE
PROTEIN UA: NEGATIVE MG/DL
RBC UA: 5 /HPF (ref 0–4)
SPECIFIC GRAVITY UA: 1.03 (ref 1–1.03)
URINE TYPE: ABNORMAL
UROBILINOGEN, URINE: 0.2 E.U./DL
WBC UA: 1 /HPF (ref 0–5)

## 2019-06-10 PROCEDURE — 81003 URINALYSIS AUTO W/O SCOPE: CPT | Performed by: FAMILY MEDICINE

## 2019-06-10 PROCEDURE — 81025 URINE PREGNANCY TEST: CPT | Performed by: FAMILY MEDICINE

## 2019-06-10 PROCEDURE — 99213 OFFICE O/P EST LOW 20 MIN: CPT | Performed by: FAMILY MEDICINE

## 2019-06-10 ASSESSMENT — ENCOUNTER SYMPTOMS
CHOKING: 0
SORE THROAT: 0
ABDOMINAL DISTENTION: 0
SINUS PRESSURE: 0
BLOOD IN STOOL: 0
COUGH: 0
RHINORRHEA: 0
RECTAL PAIN: 0
COLOR CHANGE: 0
EYE REDNESS: 0
TROUBLE SWALLOWING: 0
PHOTOPHOBIA: 0
SHORTNESS OF BREATH: 0
EYE PAIN: 0
BACK PAIN: 0
WHEEZING: 0
EYE DISCHARGE: 0
VOMITING: 0
STRIDOR: 0
APNEA: 0
NAUSEA: 0
CONSTIPATION: 0
EYE ITCHING: 0
DIARRHEA: 0
CHEST TIGHTNESS: 0

## 2019-06-10 ASSESSMENT — PATIENT HEALTH QUESTIONNAIRE - PHQ9
SUM OF ALL RESPONSES TO PHQ QUESTIONS 1-9: 0
SUM OF ALL RESPONSES TO PHQ QUESTIONS 1-9: 0
1. LITTLE INTEREST OR PLEASURE IN DOING THINGS: 0
SUM OF ALL RESPONSES TO PHQ9 QUESTIONS 1 & 2: 0
2. FEELING DOWN, DEPRESSED OR HOPELESS: 0

## 2019-06-10 NOTE — LETTER
[facility] [address] [address]  Phone: [phone]  Fax: [phone]    Kathy Norman MD        Chiqui 10, 2019     Patient: Martin Garcia   YOB: 1995   Date of Visit: 6/10/2019       To Whom it May Concern:    Martin Garcia was seen in my clinic on 6/10/2019. She may return to work on 6/11/2019. Please excuse her from work 6/8/2019-6/9/2019. If you have any questions or concerns, please don't hesitate to call.     Sincerely,         Kathy Norman MD

## 2019-06-10 NOTE — PROGRESS NOTES
Subjective:      Patient ID: Rony Bynum is a 21 y.o. female. Lower abdominal pain  HPI 22 y/o female gr 0 para 0 LMP  5/6/19, who has had some lower abdominal pain for 1 week duration. Pain is crampy in nature. Present day and night. Getting worse. Does not keep her awake at hs. But does awaken her from sleep. No bleeding. Pain is below the waist but not lateral to right or left. No diarrhea or constipation. Not vomiting at this time. Nausea for two weeks. Breasts are sore      No freq or dysuria or chills      Review of Systems   Constitutional: Negative for activity change, appetite change, chills, diaphoresis, fatigue and fever. HENT: Negative for congestion, dental problem, drooling, ear discharge, ear pain, hearing loss, mouth sores, nosebleeds, postnasal drip, rhinorrhea, sinus pressure, sneezing, sore throat, tinnitus and trouble swallowing. Eyes: Negative for photophobia, pain, discharge, redness, itching and visual disturbance. Respiratory: Negative for apnea, cough, choking, chest tightness, shortness of breath, wheezing and stridor. Cardiovascular: Negative for chest pain, palpitations and leg swelling. Gastrointestinal: Negative for abdominal distention, blood in stool, constipation, diarrhea, nausea, rectal pain and vomiting. Crampy lower abdominal pain   Genitourinary: Negative for decreased urine volume, difficulty urinating, dyspareunia, dysuria, enuresis, flank pain, frequency, genital sores, hematuria, menstrual problem, pelvic pain, urgency, vaginal bleeding and vaginal pain. Musculoskeletal: Negative for arthralgias, back pain, gait problem, joint swelling, myalgias, neck pain and neck stiffness. Skin: Negative for color change, pallor, rash and wound. Neurological: Negative for dizziness, tremors, seizures, syncope, facial asymmetry, speech difficulty, weakness, light-headedness, numbness and headaches. Hematological: Negative for adenopathy.  Does not bruise/bleed easily. Psychiatric/Behavioral: Negative for agitation, behavioral problems, confusion, decreased concentration, dysphoric mood, hallucinations, self-injury, sleep disturbance and suicidal ideas. The patient is not nervous/anxious and is not hyperactive. Objective:   Physical Exam   Constitutional: She is oriented to person, place, and time. She appears well-developed and well-nourished. No distress. HENT:   Head: Normocephalic and atraumatic. Right Ear: External ear normal.   Left Ear: External ear normal.   Nose: Nose normal.   Mouth/Throat: Oropharynx is clear and moist. No oropharyngeal exudate. Eyes: Pupils are equal, round, and reactive to light. Conjunctivae and EOM are normal. Left eye exhibits no discharge. No scleral icterus. Neck: Normal range of motion. Neck supple. No JVD present. No tracheal deviation present. No thyromegaly present. Cardiovascular: Normal rate, regular rhythm, normal heart sounds and intact distal pulses. Exam reveals no gallop and no friction rub. No murmur heard. Pulmonary/Chest: Effort normal and breath sounds normal. No stridor. No respiratory distress. She has no wheezes. She has no rales. She exhibits no tenderness. Abdominal: Soft. Bowel sounds are normal. She exhibits no distension and no mass. There is no tenderness. There is no rebound and no guarding. Some tenderness lower abdominal area   Musculoskeletal: Normal range of motion. She exhibits no edema or tenderness. Lymphadenopathy:     She has no cervical adenopathy. Neurological: She is alert and oriented to person, place, and time. She has normal reflexes. She displays normal reflexes. No cranial nerve deficit. Coordination normal.   Skin: Skin is warm and dry. No rash noted. She is not diaphoretic. No erythema. No pallor. Psychiatric: She has a normal mood and affect.  Her behavior is normal. Judgment and thought content normal.   Nursing note and vitals

## 2019-06-12 LAB — URINE CULTURE, ROUTINE: NORMAL

## 2019-06-13 ENCOUNTER — APPOINTMENT (OUTPATIENT)
Dept: ULTRASOUND IMAGING | Age: 24
End: 2019-06-13
Payer: COMMERCIAL

## 2019-06-13 ENCOUNTER — HOSPITAL ENCOUNTER (EMERGENCY)
Age: 24
Discharge: HOME | End: 2019-06-13
Payer: COMMERCIAL

## 2019-06-13 VITALS
HEART RATE: 80 BPM | DIASTOLIC BLOOD PRESSURE: 55 MMHG | BODY MASS INDEX: 26.68 KG/M2 | OXYGEN SATURATION: 97 % | TEMPERATURE: 98 F | HEIGHT: 67 IN | SYSTOLIC BLOOD PRESSURE: 117 MMHG | RESPIRATION RATE: 16 BRPM | WEIGHT: 169.97 LBS

## 2019-06-13 DIAGNOSIS — O26.899 ABDOMINAL PAIN AFFECTING PREGNANCY: ICD-10-CM

## 2019-06-13 DIAGNOSIS — Z34.90 INTRAUTERINE PREGNANCY: ICD-10-CM

## 2019-06-13 DIAGNOSIS — R10.2 VAGINAL PAIN: Primary | ICD-10-CM

## 2019-06-13 DIAGNOSIS — O99.891 ASYMPTOMATIC BACTERIURIA DURING PREGNANCY IN FIRST TRIMESTER: ICD-10-CM

## 2019-06-13 DIAGNOSIS — R82.71 ASYMPTOMATIC BACTERIURIA DURING PREGNANCY IN FIRST TRIMESTER: ICD-10-CM

## 2019-06-13 DIAGNOSIS — N83.202 OVARIAN CYST, LEFT: ICD-10-CM

## 2019-06-13 DIAGNOSIS — R10.9 ABDOMINAL PAIN AFFECTING PREGNANCY: ICD-10-CM

## 2019-06-13 LAB
ANION GAP SERPL CALCULATED.3IONS-SCNC: 12 MMOL/L (ref 3–16)
BACTERIA WET PREP: NORMAL
BACTERIA: ABNORMAL /HPF
BASOPHILS ABSOLUTE: 0 K/UL (ref 0–0.2)
BASOPHILS RELATIVE PERCENT: 0.4 %
BILIRUBIN URINE: NEGATIVE
BLOOD, URINE: NEGATIVE
BUN BLDV-MCNC: 8 MG/DL (ref 7–20)
CALCIUM SERPL-MCNC: 9.7 MG/DL (ref 8.3–10.6)
CHLORIDE BLD-SCNC: 100 MMOL/L (ref 99–110)
CLARITY: ABNORMAL
CLUE CELLS: NORMAL
CO2: 19 MMOL/L (ref 21–32)
COLOR: YELLOW
CREAT SERPL-MCNC: 0.5 MG/DL (ref 0.6–1.1)
EOSINOPHILS ABSOLUTE: 0.1 K/UL (ref 0–0.6)
EOSINOPHILS RELATIVE PERCENT: 1 %
EPITHELIAL CELLS WET PREP: NORMAL
EPITHELIAL CELLS, UA: 9 /HPF (ref 0–5)
GFR AFRICAN AMERICAN: >60
GFR NON-AFRICAN AMERICAN: >60
GLUCOSE BLD-MCNC: 94 MG/DL (ref 70–99)
GLUCOSE URINE: NEGATIVE MG/DL
GONADOTROPIN, CHORIONIC (HCG) QUANT: 4293 MIU/ML
HCT VFR BLD CALC: 43 % (ref 36–48)
HEMOGLOBIN: 14.5 G/DL (ref 12–16)
HYALINE CASTS: 7 /LPF (ref 0–8)
KETONES, URINE: 40 MG/DL
LEUKOCYTE ESTERASE, URINE: NEGATIVE
LYMPHOCYTES ABSOLUTE: 1.5 K/UL (ref 1–5.1)
LYMPHOCYTES RELATIVE PERCENT: 16.5 %
MCH RBC QN AUTO: 29.6 PG (ref 26–34)
MCHC RBC AUTO-ENTMCNC: 33.8 G/DL (ref 31–36)
MCV RBC AUTO: 87.7 FL (ref 80–100)
MICROSCOPIC EXAMINATION: YES
MONOCYTES ABSOLUTE: 0.5 K/UL (ref 0–1.3)
MONOCYTES RELATIVE PERCENT: 6 %
NEUTROPHILS ABSOLUTE: 6.9 K/UL (ref 1.7–7.7)
NEUTROPHILS RELATIVE PERCENT: 76.1 %
NITRITE, URINE: NEGATIVE
PDW BLD-RTO: 12.9 % (ref 12.4–15.4)
PH UA: 6 (ref 5–8)
PLATELET # BLD: 261 K/UL (ref 135–450)
PMV BLD AUTO: 7.3 FL (ref 5–10.5)
POTASSIUM SERPL-SCNC: 3.9 MMOL/L (ref 3.5–5.1)
PROTEIN UA: NEGATIVE MG/DL
RBC # BLD: 4.9 M/UL (ref 4–5.2)
RBC UA: 7 /HPF (ref 0–4)
RBC WET PREP: NORMAL
SODIUM BLD-SCNC: 131 MMOL/L (ref 136–145)
SOURCE WET PREP: NORMAL
SPECIFIC GRAVITY UA: 1.02 (ref 1–1.03)
TRICHOMONAS PREP: NORMAL
URINE REFLEX TO CULTURE: ABNORMAL
URINE TYPE: ABNORMAL
UROBILINOGEN, URINE: 0.2 E.U./DL
WBC # BLD: 9 K/UL (ref 4–11)
WBC UA: 2 /HPF (ref 0–5)
WBC WET PREP: NORMAL
YEAST WET PREP: NORMAL

## 2019-06-13 PROCEDURE — 87210 SMEAR WET MOUNT SALINE/INK: CPT

## 2019-06-13 PROCEDURE — 87591 N.GONORRHOEAE DNA AMP PROB: CPT

## 2019-06-13 PROCEDURE — 85025 COMPLETE CBC W/AUTO DIFF WBC: CPT

## 2019-06-13 PROCEDURE — 99284 EMERGENCY DEPT VISIT MOD MDM: CPT

## 2019-06-13 PROCEDURE — 81001 URINALYSIS AUTO W/SCOPE: CPT

## 2019-06-13 PROCEDURE — 87491 CHLMYD TRACH DNA AMP PROBE: CPT

## 2019-06-13 PROCEDURE — 76817 TRANSVAGINAL US OBSTETRIC: CPT

## 2019-06-13 PROCEDURE — 76801 OB US < 14 WKS SINGLE FETUS: CPT

## 2019-06-13 PROCEDURE — 84702 CHORIONIC GONADOTROPIN TEST: CPT

## 2019-06-13 PROCEDURE — 80048 BASIC METABOLIC PNL TOTAL CA: CPT

## 2019-06-13 RX ORDER — CEPHALEXIN 500 MG/1
500 CAPSULE ORAL 2 TIMES DAILY
Qty: 14 CAPSULE | Refills: 0 | Status: SHIPPED | OUTPATIENT
Start: 2019-06-13 | End: 2019-06-20

## 2019-06-13 RX ORDER — PRENATAL VIT/IRON FUM/FOLIC AC 27MG-0.8MG
1 TABLET ORAL DAILY
Qty: 30 TABLET | Refills: 2 | Status: SHIPPED | OUTPATIENT
Start: 2019-06-13 | End: 2021-04-23

## 2019-06-13 ASSESSMENT — ENCOUNTER SYMPTOMS
NAUSEA: 1
ABDOMINAL PAIN: 1
SHORTNESS OF BREATH: 0
VOMITING: 0

## 2019-06-13 NOTE — ED PROVIDER NOTES
[address] [facility]      Pt Name: Tracey Venegas  MRN: [id number]  [place] 1995  Date of evaluation: 2019  Provider: Nena Topete, EMILIA    This patient was not seen and evaluated by the attending physician     [address] [place] [address]       Chief Complaint   Patient presents with    Abdominal Pain         HISTORYOF PRESENT ILLNESS  (Location/Symptom, Timing/Onset, Context/Setting, Quality, Duration, Modifying Factors, Severity.)   Tracey Venegas is a 21 y.o. female who is 5 weeks  with LMP on 19 and presents to the emergency department with vaginal cramping for a week. She states she is having about three cramping episodes a day that last about 10 minutes a piece. Sometimes waking her up from her sleep. She reports the pain becoming worse with movement. She has been taking Tylenol for her pain with minimal relief. She has associated nausea, lower abdominal discomfort and frequency of urination. She denies vomiting, vaginal bleeding, dyspareunia, dysuria, and hematuria. She states having similar vaginal cramping when she was having menstrual periods, but these cramps being much worse. She recently went to [address] [place] [address] for this same pain 3 days ago. She had a inconclusive bedside ultrasound and was told to go to [facility] for formal ultrasound. She did not follow up and her symptoms have persisted so she presents today. Nursing Notes were reviewedand I agree. REVIEW OF SYSTEMS    (2-9 systems for level 4, 10 or more forlevel 5)     Review of Systems   Constitutional: Negative for chills and fever. Respiratory: Negative for shortness of breath. Cardiovascular: Negative for chest pain. Gastrointestinal: Positive for abdominal pain (lower) and nausea. Negative for vomiting. Genitourinary: Positive for vaginal pain. Negative for difficulty urinating, dyspareunia, dysuria, hematuria, vaginal bleeding and vaginal discharge.    Skin: patient. My supervising physician was available for consultation. The patient has nontoxic and afebrile. Her abdomen is soft and nontender. She is in no distress and did not require any pain medication. Pelvic exam is reassuring without cervical motion or adnexal tenderness. She has no bleeding. The patient's wet prep is negative. Gonorrhea and Chlamydia cultures were sent but low suspicion for infection at this time. Urinalysis does show rare bacteria and the patient will be treated for asymptomatic bacteriuria in pregnancy with Keflex. The patient has a normal white blood cell count. She is not anemic. Her electrolytes are grossly normal with the exception of a sodium of 131. Her renal function is normal.  Her quantitative hCG today is 4293. Upon chart review in previous visit on 6/10, previous quantitative hCG was 2071 and therefore patient's numbers appear to be trending appropriately. Transvaginal ultrasound shows small gestational sac within the uterine fundus with estimated age of 5 weeks 2 days which corresponds to the patient's dates. The fetal pole and yolk sac are not yet visualized however this is very early in pregnancy. She does have a 1.7 cm heterogenous hypoechoic structure in left ovary which is avascular, likely a small hemorrhagic cyst.  The patient was reassured. We did discuss acute return precautions however I have been able to confirm intrauterine pregnancy. Her numbers are trending appropriately suggesting this is a normal pregnancy however I have cautioned her there is still chances that this could go on to be a miscarriage. She was started on prenatal vitamins. She was given Keflex for asymptomatic bacteriuria. She was told she may use Tylenol in pregnancy. She was provided referral to the [facility] prenatal clinic. Discussed results, diagnosis and plan with patient and/or family. Questions addressed. Dispositionand follow-up agreed upon.  Specific discharge instructions explained. The patient and/or family and I have discussed the diagnosis and risks, and we agree with discharging home to follow-up with their primary care,specialist or referral doctor. We also discussed returning to the Emergency Department immediately if new or worsening symptoms occur. We have discussed the symptoms which are most concerning that necessitate immediatereturn. PROCEDURES:  None    FINAL IMPRESSION      1. Vaginal pain    2. Abdominal pain affecting pregnancy    3. Ovarian cyst, left    4. Asymptomatic bacteriuria during pregnancy in first trimester    5.  Intrauterine pregnancy          DISPOSITION/PLAN   DISPOSITION        PATIENT REFERRED TO:  [place] [address] [place] [address]  [phone]  Schedule an appointment as soon as possible for a visit       [facility] Emergency Department  [address] [place] [address]  [phone]    If symptoms worsen      MEDICATIONS:  New Prescriptions    CEPHALEXIN (KEFLEX) 500 MG CAPSULE    Take 1 capsule by mouth 2 times daily for 7 days    PRENATAL VIT-FE FUMARATE-FA (PRENATAL VITAMIN) 27-0.8 MG TABS    Take 1 tablet by mouth daily       (Please note that portions of this note were completed with a voice recognition program.  Efforts were made toedit the dictations but occasionally words are mis-transcribed.)    EMILIA Elder PA-C  06/13/19 3439

## 2019-06-13 NOTE — PROCEDURES
Will call for patient when positive beta hcg is available  Electronically signed by Uzair Powers on 6/13/2019 at 1:07 PM

## 2019-06-14 LAB
C TRACH DNA GENITAL QL NAA+PROBE: NEGATIVE
N. GONORRHOEAE DNA: NEGATIVE

## 2019-07-04 LAB
HEP B, EXTERNAL RESULT: NEGATIVE
RUBELLA TITER, EXTERNAL RESULT: NORMAL

## 2019-07-09 ENCOUNTER — TELEPHONE (OUTPATIENT)
Dept: PRIMARY CARE CLINIC | Age: 24
End: 2019-07-09

## 2019-07-12 ENCOUNTER — OFFICE VISIT (OUTPATIENT)
Dept: PRIMARY CARE CLINIC | Age: 24
End: 2019-07-12
Payer: COMMERCIAL

## 2019-07-12 VITALS
WEIGHT: 168 LBS | HEART RATE: 88 BPM | HEIGHT: 67 IN | BODY MASS INDEX: 26.37 KG/M2 | DIASTOLIC BLOOD PRESSURE: 72 MMHG | OXYGEN SATURATION: 99 % | RESPIRATION RATE: 12 BRPM | SYSTOLIC BLOOD PRESSURE: 110 MMHG | TEMPERATURE: 97.5 F

## 2019-07-12 DIAGNOSIS — R82.90 ABNORMAL URINALYSIS: Primary | ICD-10-CM

## 2019-07-12 DIAGNOSIS — Z33.1 IUP (INTRAUTERINE PREGNANCY), INCIDENTAL: ICD-10-CM

## 2019-07-12 PROCEDURE — 99214 OFFICE O/P EST MOD 30 MIN: CPT | Performed by: FAMILY MEDICINE

## 2019-07-12 ASSESSMENT — ENCOUNTER SYMPTOMS
COLOR CHANGE: 0
CHEST TIGHTNESS: 0
CONSTIPATION: 0
RHINORRHEA: 0
NAUSEA: 0
APNEA: 0
PHOTOPHOBIA: 0
TROUBLE SWALLOWING: 0
SORE THROAT: 0
EYE ITCHING: 0
COUGH: 0
EYE DISCHARGE: 0
SINUS PRESSURE: 0
RECTAL PAIN: 0
BACK PAIN: 0
BLOOD IN STOOL: 0
EYE PAIN: 0
WHEEZING: 0
EYE REDNESS: 0
STRIDOR: 0
CHOKING: 0
SHORTNESS OF BREATH: 0
VOMITING: 0
ABDOMINAL DISTENTION: 0
DIARRHEA: 0

## 2019-11-07 LAB — RPR, EXTERNAL RESULT: NORMAL

## 2020-01-20 LAB — GBS, EXTERNAL RESULT: NEGATIVE

## 2020-02-16 ENCOUNTER — ANESTHESIA (OUTPATIENT)
Dept: LABOR AND DELIVERY | Age: 25
End: 2020-02-16
Payer: COMMERCIAL

## 2020-02-16 ENCOUNTER — APPOINTMENT (OUTPATIENT)
Dept: LABOR AND DELIVERY | Age: 25
End: 2020-02-16
Payer: COMMERCIAL

## 2020-02-16 ENCOUNTER — HOSPITAL ENCOUNTER (INPATIENT)
Age: 25
LOS: 4 days | Discharge: HOME | End: 2020-02-20
Attending: OBSTETRICS & GYNECOLOGY | Admitting: OBSTETRICS & GYNECOLOGY
Payer: COMMERCIAL

## 2020-02-16 ENCOUNTER — ANESTHESIA EVENT (OUTPATIENT)
Dept: LABOR AND DELIVERY | Age: 25
End: 2020-02-16
Payer: COMMERCIAL

## 2020-02-16 PROBLEM — Z34.90 ENCOUNTER FOR ELECTIVE INDUCTION OF LABOR: Status: ACTIVE | Noted: 2020-02-16

## 2020-02-16 LAB
ABO/RH: NORMAL
AMPHETAMINE SCREEN, URINE: NORMAL
ANTIBODY SCREEN: NORMAL
BARBITURATE SCREEN URINE: NORMAL
BENZODIAZEPINE SCREEN, URINE: NORMAL
BUPRENORPHINE URINE: NORMAL
CANNABINOID SCREEN URINE: NORMAL
COCAINE METABOLITE SCREEN URINE: NORMAL
HCT VFR BLD CALC: 37.3 % (ref 36–48)
HEMOGLOBIN: 12.5 G/DL (ref 12–16)
Lab: NORMAL
MCH RBC QN AUTO: 27.7 PG (ref 26–34)
MCHC RBC AUTO-ENTMCNC: 33.4 G/DL (ref 31–36)
MCV RBC AUTO: 83 FL (ref 80–100)
METHADONE SCREEN, URINE: NORMAL
OPIATE SCREEN URINE: NORMAL
OXYCODONE URINE: NORMAL
PDW BLD-RTO: 13.5 % (ref 12.4–15.4)
PH UA: 6
PHENCYCLIDINE SCREEN URINE: NORMAL
PLATELET # BLD: 265 K/UL (ref 135–450)
PMV BLD AUTO: 7.3 FL (ref 5–10.5)
PROPOXYPHENE SCREEN: NORMAL
RBC # BLD: 4.5 M/UL (ref 4–5.2)
WBC # BLD: 14.2 K/UL (ref 4–11)

## 2020-02-16 PROCEDURE — 59025 FETAL NON-STRESS TEST: CPT

## 2020-02-16 PROCEDURE — 85027 COMPLETE CBC AUTOMATED: CPT

## 2020-02-16 PROCEDURE — 1220000000 HC SEMI PRIVATE OB R&B

## 2020-02-16 PROCEDURE — 86900 BLOOD TYPING SEROLOGIC ABO: CPT

## 2020-02-16 PROCEDURE — 86780 TREPONEMA PALLIDUM: CPT

## 2020-02-16 PROCEDURE — 86850 RBC ANTIBODY SCREEN: CPT

## 2020-02-16 PROCEDURE — 80307 DRUG TEST PRSMV CHEM ANLYZR: CPT

## 2020-02-16 PROCEDURE — 6370000000 HC RX 637 (ALT 250 FOR IP): Performed by: OBSTETRICS & GYNECOLOGY

## 2020-02-16 PROCEDURE — 2580000003 HC RX 258: Performed by: OBSTETRICS & GYNECOLOGY

## 2020-02-16 PROCEDURE — 86901 BLOOD TYPING SEROLOGIC RH(D): CPT

## 2020-02-16 PROCEDURE — 3E0P7VZ INTRODUCTION OF HORMONE INTO FEMALE REPRODUCTIVE, VIA NATURAL OR ARTIFICIAL OPENING: ICD-10-PCS | Performed by: OBSTETRICS & GYNECOLOGY

## 2020-02-16 RX ORDER — HYDROXYZINE PAMOATE 25 MG/1
50 CAPSULE ORAL EVERY 6 HOURS PRN
Status: DISCONTINUED | OUTPATIENT
Start: 2020-02-16 | End: 2020-02-20 | Stop reason: HOSPADM

## 2020-02-16 RX ORDER — HYDROXYZINE PAMOATE 25 MG/1
50 CAPSULE ORAL ONCE
Status: DISCONTINUED | OUTPATIENT
Start: 2020-02-16 | End: 2020-02-16

## 2020-02-16 RX ORDER — ONDANSETRON 2 MG/ML
4 INJECTION INTRAMUSCULAR; INTRAVENOUS EVERY 6 HOURS PRN
Status: DISCONTINUED | OUTPATIENT
Start: 2020-02-16 | End: 2020-02-17 | Stop reason: SDUPTHER

## 2020-02-16 RX ORDER — SODIUM CHLORIDE 0.9 % (FLUSH) 0.9 %
10 SYRINGE (ML) INJECTION PRN
Status: DISCONTINUED | OUTPATIENT
Start: 2020-02-16 | End: 2020-02-17

## 2020-02-16 RX ORDER — SODIUM CHLORIDE 0.9 % (FLUSH) 0.9 %
10 SYRINGE (ML) INJECTION EVERY 12 HOURS SCHEDULED
Status: DISCONTINUED | OUTPATIENT
Start: 2020-02-16 | End: 2020-02-17

## 2020-02-16 RX ORDER — FLUCONAZOLE 100 MG/1
150 TABLET ORAL
Status: ACTIVE | OUTPATIENT
Start: 2020-02-16 | End: 2020-02-16

## 2020-02-16 RX ORDER — SODIUM CHLORIDE, SODIUM LACTATE, POTASSIUM CHLORIDE, CALCIUM CHLORIDE 600; 310; 30; 20 MG/100ML; MG/100ML; MG/100ML; MG/100ML
INJECTION, SOLUTION INTRAVENOUS CONTINUOUS
Status: DISCONTINUED | OUTPATIENT
Start: 2020-02-16 | End: 2020-02-17

## 2020-02-16 RX ORDER — DOCUSATE SODIUM 100 MG/1
100 CAPSULE, LIQUID FILLED ORAL 2 TIMES DAILY
Status: DISCONTINUED | OUTPATIENT
Start: 2020-02-16 | End: 2020-02-17 | Stop reason: SDUPTHER

## 2020-02-16 RX ADMIN — DINOPROSTONE 10 MG: 10 INSERT VAGINAL at 17:32

## 2020-02-16 RX ADMIN — SODIUM CHLORIDE, POTASSIUM CHLORIDE, SODIUM LACTATE AND CALCIUM CHLORIDE: 600; 310; 30; 20 INJECTION, SOLUTION INTRAVENOUS at 17:00

## 2020-02-16 RX ADMIN — HYDROXYZINE PAMOATE 50 MG: 25 CAPSULE ORAL at 22:53

## 2020-02-16 ASSESSMENT — PAIN DESCRIPTION - DESCRIPTORS
DESCRIPTORS: ACHING
DESCRIPTORS: CRAMPING
DESCRIPTORS: ACHING

## 2020-02-17 VITALS
OXYGEN SATURATION: 100 % | DIASTOLIC BLOOD PRESSURE: 66 MMHG | RESPIRATION RATE: 17 BRPM | SYSTOLIC BLOOD PRESSURE: 139 MMHG

## 2020-02-17 LAB — TOTAL SYPHILLIS IGG/IGM: NORMAL

## 2020-02-17 PROCEDURE — 1220000000 HC SEMI PRIVATE OB R&B

## 2020-02-17 PROCEDURE — 6360000002 HC RX W HCPCS: Performed by: OBSTETRICS & GYNECOLOGY

## 2020-02-17 PROCEDURE — 51701 INSERT BLADDER CATHETER: CPT

## 2020-02-17 PROCEDURE — 2580000003 HC RX 258: Performed by: OBSTETRICS & GYNECOLOGY

## 2020-02-17 PROCEDURE — 6360000002 HC RX W HCPCS: Performed by: ANESTHESIOLOGY

## 2020-02-17 PROCEDURE — 3700000000 HC ANESTHESIA ATTENDED CARE: Performed by: OBSTETRICS & GYNECOLOGY

## 2020-02-17 PROCEDURE — 3700000025 EPIDURAL BLOCK: Performed by: ANESTHESIOLOGY

## 2020-02-17 PROCEDURE — 2580000003 HC RX 258: Performed by: NURSE ANESTHETIST, CERTIFIED REGISTERED

## 2020-02-17 PROCEDURE — 3700000001 HC ADD 15 MINUTES (ANESTHESIA): Performed by: OBSTETRICS & GYNECOLOGY

## 2020-02-17 PROCEDURE — 6360000002 HC RX W HCPCS

## 2020-02-17 PROCEDURE — 2500000003 HC RX 250 WO HCPCS

## 2020-02-17 PROCEDURE — 7100000000 HC PACU RECOVERY - FIRST 15 MIN: Performed by: OBSTETRICS & GYNECOLOGY

## 2020-02-17 PROCEDURE — 2709999900 HC NON-CHARGEABLE SUPPLY: Performed by: OBSTETRICS & GYNECOLOGY

## 2020-02-17 PROCEDURE — 7100000001 HC PACU RECOVERY - ADDTL 15 MIN: Performed by: OBSTETRICS & GYNECOLOGY

## 2020-02-17 PROCEDURE — 6370000000 HC RX 637 (ALT 250 FOR IP): Performed by: OBSTETRICS & GYNECOLOGY

## 2020-02-17 PROCEDURE — 3609079900 HC CESAREAN SECTION: Performed by: OBSTETRICS & GYNECOLOGY

## 2020-02-17 PROCEDURE — 3E0E37Z INTRODUCTION OF ELECTROLYTIC AND WATER BALANCE SUBSTANCE INTO PRODUCTS OF CONCEPTION, PERCUTANEOUS APPROACH: ICD-10-PCS | Performed by: OBSTETRICS & GYNECOLOGY

## 2020-02-17 PROCEDURE — 2500000003 HC RX 250 WO HCPCS: Performed by: NURSE ANESTHETIST, CERTIFIED REGISTERED

## 2020-02-17 PROCEDURE — 6360000002 HC RX W HCPCS: Performed by: NURSE ANESTHETIST, CERTIFIED REGISTERED

## 2020-02-17 RX ORDER — METOCLOPRAMIDE HYDROCHLORIDE 5 MG/ML
10 INJECTION INTRAMUSCULAR; INTRAVENOUS ONCE
Status: DISCONTINUED | OUTPATIENT
Start: 2020-02-17 | End: 2020-02-17

## 2020-02-17 RX ORDER — BUPIVACAINE HYDROCHLORIDE 5 MG/ML
INJECTION, SOLUTION EPIDURAL; INTRACAUDAL PRN
Status: DISCONTINUED | OUTPATIENT
Start: 2020-02-17 | End: 2020-02-17 | Stop reason: SDUPTHER

## 2020-02-17 RX ORDER — KETOROLAC TROMETHAMINE 30 MG/ML
30 INJECTION, SOLUTION INTRAMUSCULAR; INTRAVENOUS EVERY 6 HOURS
Status: DISCONTINUED | OUTPATIENT
Start: 2020-02-17 | End: 2020-02-17 | Stop reason: SDUPTHER

## 2020-02-17 RX ORDER — ONDANSETRON 2 MG/ML
INJECTION INTRAMUSCULAR; INTRAVENOUS PRN
Status: DISCONTINUED | OUTPATIENT
Start: 2020-02-17 | End: 2020-02-17 | Stop reason: SDUPTHER

## 2020-02-17 RX ORDER — DIPHENHYDRAMINE HYDROCHLORIDE 50 MG/ML
25 INJECTION INTRAMUSCULAR; INTRAVENOUS EVERY 6 HOURS PRN
Status: DISCONTINUED | OUTPATIENT
Start: 2020-02-17 | End: 2020-02-20 | Stop reason: HOSPADM

## 2020-02-17 RX ORDER — KETOROLAC TROMETHAMINE 30 MG/ML
INJECTION, SOLUTION INTRAMUSCULAR; INTRAVENOUS PRN
Status: DISCONTINUED | OUTPATIENT
Start: 2020-02-17 | End: 2020-02-17 | Stop reason: SDUPTHER

## 2020-02-17 RX ORDER — ACETAMINOPHEN 325 MG/1
650 TABLET ORAL EVERY 4 HOURS PRN
Status: DISCONTINUED | OUTPATIENT
Start: 2020-02-17 | End: 2020-02-20 | Stop reason: HOSPADM

## 2020-02-17 RX ORDER — SODIUM CHLORIDE 0.9 % (FLUSH) 0.9 %
10 SYRINGE (ML) INJECTION PRN
Status: DISCONTINUED | OUTPATIENT
Start: 2020-02-17 | End: 2020-02-20 | Stop reason: HOSPADM

## 2020-02-17 RX ORDER — ACETAMINOPHEN 650 MG/1
650 SUPPOSITORY RECTAL EVERY 4 HOURS PRN
Status: DISCONTINUED | OUTPATIENT
Start: 2020-02-17 | End: 2020-02-17

## 2020-02-17 RX ORDER — DOCUSATE SODIUM 100 MG/1
100 CAPSULE, LIQUID FILLED ORAL 2 TIMES DAILY
Status: DISCONTINUED | OUTPATIENT
Start: 2020-02-17 | End: 2020-02-20 | Stop reason: HOSPADM

## 2020-02-17 RX ORDER — ONDANSETRON 2 MG/ML
4 INJECTION INTRAMUSCULAR; INTRAVENOUS EVERY 6 HOURS PRN
Status: DISCONTINUED | OUTPATIENT
Start: 2020-02-17 | End: 2020-02-17 | Stop reason: SDUPTHER

## 2020-02-17 RX ORDER — SODIUM CHLORIDE, SODIUM LACTATE, POTASSIUM CHLORIDE, CALCIUM CHLORIDE 600; 310; 30; 20 MG/100ML; MG/100ML; MG/100ML; MG/100ML
INJECTION, SOLUTION INTRAVENOUS CONTINUOUS PRN
Status: DISCONTINUED | OUTPATIENT
Start: 2020-02-17 | End: 2020-02-17 | Stop reason: SDUPTHER

## 2020-02-17 RX ORDER — ESCITALOPRAM OXALATE 10 MG/1
20 TABLET ORAL DAILY
Status: DISCONTINUED | OUTPATIENT
Start: 2020-02-17 | End: 2020-02-20 | Stop reason: HOSPADM

## 2020-02-17 RX ORDER — SODIUM CHLORIDE, SODIUM LACTATE, POTASSIUM CHLORIDE, CALCIUM CHLORIDE 600; 310; 30; 20 MG/100ML; MG/100ML; MG/100ML; MG/100ML
INJECTION, SOLUTION INTRAVENOUS CONTINUOUS
Status: DISCONTINUED | OUTPATIENT
Start: 2020-02-17 | End: 2020-02-20 | Stop reason: HOSPADM

## 2020-02-17 RX ORDER — ACETAMINOPHEN 325 MG/1
650 TABLET ORAL ONCE
Status: COMPLETED | OUTPATIENT
Start: 2020-02-17 | End: 2020-02-17

## 2020-02-17 RX ORDER — MORPHINE SULFATE 1 MG/ML
INJECTION, SOLUTION EPIDURAL; INTRATHECAL; INTRAVENOUS PRN
Status: DISCONTINUED | OUTPATIENT
Start: 2020-02-17 | End: 2020-02-17 | Stop reason: SDUPTHER

## 2020-02-17 RX ORDER — MIDAZOLAM HYDROCHLORIDE 1 MG/ML
INJECTION INTRAMUSCULAR; INTRAVENOUS PRN
Status: DISCONTINUED | OUTPATIENT
Start: 2020-02-17 | End: 2020-02-17 | Stop reason: SDUPTHER

## 2020-02-17 RX ORDER — OXYTOCIN 10 [USP'U]/ML
INJECTION, SOLUTION INTRAMUSCULAR; INTRAVENOUS PRN
Status: DISCONTINUED | OUTPATIENT
Start: 2020-02-17 | End: 2020-02-17 | Stop reason: SDUPTHER

## 2020-02-17 RX ORDER — ONDANSETRON 2 MG/ML
4 INJECTION INTRAMUSCULAR; INTRAVENOUS EVERY 6 HOURS PRN
Status: DISCONTINUED | OUTPATIENT
Start: 2020-02-17 | End: 2020-02-20 | Stop reason: HOSPADM

## 2020-02-17 RX ORDER — LIDOCAINE HYDROCHLORIDE AND EPINEPHRINE 20; 5 MG/ML; UG/ML
INJECTION, SOLUTION EPIDURAL; INFILTRATION; INTRACAUDAL; PERINEURAL PRN
Status: DISCONTINUED | OUTPATIENT
Start: 2020-02-17 | End: 2020-02-17 | Stop reason: SDUPTHER

## 2020-02-17 RX ORDER — OXYCODONE HYDROCHLORIDE AND ACETAMINOPHEN 5; 325 MG/1; MG/1
2 TABLET ORAL EVERY 4 HOURS PRN
Status: DISCONTINUED | OUTPATIENT
Start: 2020-02-17 | End: 2020-02-20 | Stop reason: HOSPADM

## 2020-02-17 RX ORDER — NALBUPHINE HCL 10 MG/ML
5 AMPUL (ML) INJECTION EVERY 4 HOURS PRN
Status: DISCONTINUED | OUTPATIENT
Start: 2020-02-17 | End: 2020-02-20 | Stop reason: HOSPADM

## 2020-02-17 RX ORDER — KETOROLAC TROMETHAMINE 30 MG/ML
30 INJECTION, SOLUTION INTRAMUSCULAR; INTRAVENOUS EVERY 6 HOURS
Status: COMPLETED | OUTPATIENT
Start: 2020-02-17 | End: 2020-02-18

## 2020-02-17 RX ORDER — ONDANSETRON 2 MG/ML
4 INJECTION INTRAMUSCULAR; INTRAVENOUS EVERY 6 HOURS PRN
Status: DISCONTINUED | OUTPATIENT
Start: 2020-02-17 | End: 2020-02-17

## 2020-02-17 RX ORDER — CEFAZOLIN SODIUM 1 G/3ML
INJECTION, POWDER, FOR SOLUTION INTRAMUSCULAR; INTRAVENOUS PRN
Status: DISCONTINUED | OUTPATIENT
Start: 2020-02-17 | End: 2020-02-17 | Stop reason: SDUPTHER

## 2020-02-17 RX ORDER — NALOXONE HYDROCHLORIDE 0.4 MG/ML
0.4 INJECTION, SOLUTION INTRAMUSCULAR; INTRAVENOUS; SUBCUTANEOUS PRN
Status: DISCONTINUED | OUTPATIENT
Start: 2020-02-17 | End: 2020-02-20 | Stop reason: HOSPADM

## 2020-02-17 RX ORDER — LIDOCAINE HYDROCHLORIDE AND EPINEPHRINE 15; 5 MG/ML; UG/ML
INJECTION, SOLUTION EPIDURAL PRN
Status: DISCONTINUED | OUTPATIENT
Start: 2020-02-17 | End: 2020-02-17 | Stop reason: SDUPTHER

## 2020-02-17 RX ORDER — ONDANSETRON 4 MG/1
8 TABLET, FILM COATED ORAL EVERY 8 HOURS PRN
Status: DISCONTINUED | OUTPATIENT
Start: 2020-02-17 | End: 2020-02-20 | Stop reason: HOSPADM

## 2020-02-17 RX ORDER — LANOLIN 100 %
OINTMENT (GRAM) TOPICAL
Status: DISCONTINUED | OUTPATIENT
Start: 2020-02-17 | End: 2020-02-20 | Stop reason: HOSPADM

## 2020-02-17 RX ORDER — OXYCODONE HYDROCHLORIDE AND ACETAMINOPHEN 5; 325 MG/1; MG/1
1 TABLET ORAL EVERY 4 HOURS PRN
Status: DISCONTINUED | OUTPATIENT
Start: 2020-02-17 | End: 2020-02-20 | Stop reason: HOSPADM

## 2020-02-17 RX ORDER — KETAMINE HCL IN NACL, ISO-OSM 100MG/10ML
SYRINGE (ML) INJECTION PRN
Status: DISCONTINUED | OUTPATIENT
Start: 2020-02-17 | End: 2020-02-17 | Stop reason: SDUPTHER

## 2020-02-17 RX ORDER — NALOXONE HYDROCHLORIDE 0.4 MG/ML
0.4 INJECTION, SOLUTION INTRAMUSCULAR; INTRAVENOUS; SUBCUTANEOUS PRN
Status: DISCONTINUED | OUTPATIENT
Start: 2020-02-17 | End: 2020-02-17

## 2020-02-17 RX ORDER — METOCLOPRAMIDE HYDROCHLORIDE 5 MG/ML
INJECTION INTRAMUSCULAR; INTRAVENOUS
Status: DISCONTINUED
Start: 2020-02-17 | End: 2020-02-17

## 2020-02-17 RX ORDER — SODIUM CHLORIDE 0.9 % (FLUSH) 0.9 %
10 SYRINGE (ML) INJECTION EVERY 12 HOURS SCHEDULED
Status: DISCONTINUED | OUTPATIENT
Start: 2020-02-17 | End: 2020-02-20 | Stop reason: HOSPADM

## 2020-02-17 RX ORDER — MEPERIDINE HYDROCHLORIDE 25 MG/ML
INJECTION INTRAMUSCULAR; INTRAVENOUS; SUBCUTANEOUS PRN
Status: DISCONTINUED | OUTPATIENT
Start: 2020-02-17 | End: 2020-02-17 | Stop reason: SDUPTHER

## 2020-02-17 RX ORDER — IBUPROFEN 400 MG/1
800 TABLET ORAL EVERY 6 HOURS PRN
Status: DISCONTINUED | OUTPATIENT
Start: 2020-02-18 | End: 2020-02-20 | Stop reason: HOSPADM

## 2020-02-17 RX ADMIN — Medication 5 ML: at 05:41

## 2020-02-17 RX ADMIN — Medication 50 MG: at 12:56

## 2020-02-17 RX ADMIN — LIDOCAINE HYDROCHLORIDE,EPINEPHRINE BITARTRATE 5 ML: 20; .005 INJECTION, SOLUTION EPIDURAL; INFILTRATION; INTRACAUDAL; PERINEURAL at 12:36

## 2020-02-17 RX ADMIN — CEFAZOLIN SODIUM 2 G: 10 INJECTION, POWDER, FOR SOLUTION INTRAVENOUS at 12:34

## 2020-02-17 RX ADMIN — MEPERIDINE HYDROCHLORIDE 12.5 MG: 25 INJECTION, SOLUTION INTRAMUSCULAR; INTRAVENOUS; SUBCUTANEOUS at 13:06

## 2020-02-17 RX ADMIN — LIDOCAINE HYDROCHLORIDE,EPINEPHRINE BITARTRATE 5 ML: 20; .005 INJECTION, SOLUTION EPIDURAL; INFILTRATION; INTRACAUDAL; PERINEURAL at 12:40

## 2020-02-17 RX ADMIN — Medication 50 MG: at 12:59

## 2020-02-17 RX ADMIN — KETOROLAC TROMETHAMINE 30 MG: 30 INJECTION, SOLUTION INTRAMUSCULAR at 19:14

## 2020-02-17 RX ADMIN — Medication 50 ML/HR: at 13:29

## 2020-02-17 RX ADMIN — LIDOCAINE HYDROCHLORIDE,EPINEPHRINE BITARTRATE 5 ML: 20; .005 INJECTION, SOLUTION EPIDURAL; INFILTRATION; INTRACAUDAL; PERINEURAL at 12:38

## 2020-02-17 RX ADMIN — METOCLOPRAMIDE 10 MG: 5 INJECTION, SOLUTION INTRAMUSCULAR; INTRAVENOUS at 12:45

## 2020-02-17 RX ADMIN — SODIUM CHLORIDE, POTASSIUM CHLORIDE, SODIUM LACTATE AND CALCIUM CHLORIDE: 600; 310; 30; 20 INJECTION, SOLUTION INTRAVENOUS at 05:00

## 2020-02-17 RX ADMIN — LIDOCAINE HYDROCHLORIDE,EPINEPHRINE BITARTRATE 5 ML: 20; .005 INJECTION, SOLUTION EPIDURAL; INFILTRATION; INTRACAUDAL; PERINEURAL at 12:58

## 2020-02-17 RX ADMIN — MORPHINE SULFATE 4 MG: 1 INJECTION, SOLUTION EPIDURAL; INTRATHECAL; INTRAVENOUS at 13:08

## 2020-02-17 RX ADMIN — Medication 1 MILLI-UNITS/MIN: at 05:59

## 2020-02-17 RX ADMIN — MEPERIDINE HYDROCHLORIDE 12.5 MG: 25 INJECTION, SOLUTION INTRAMUSCULAR; INTRAVENOUS; SUBCUTANEOUS at 13:14

## 2020-02-17 RX ADMIN — Medication 5 ML: at 05:35

## 2020-02-17 RX ADMIN — SODIUM CHLORIDE, SODIUM LACTATE, POTASSIUM CHLORIDE, AND CALCIUM CHLORIDE: .6; .31; .03; .02 INJECTION, SOLUTION INTRAVENOUS at 13:30

## 2020-02-17 RX ADMIN — FAMOTIDINE 20 MG: 10 INJECTION, SOLUTION INTRAVENOUS at 12:29

## 2020-02-17 RX ADMIN — OXYTOCIN 20 UNITS: 10 INJECTION INTRAVENOUS at 13:04

## 2020-02-17 RX ADMIN — SODIUM CHLORIDE, POTASSIUM CHLORIDE, SODIUM LACTATE AND CALCIUM CHLORIDE: 600; 310; 30; 20 INJECTION, SOLUTION INTRAVENOUS at 20:57

## 2020-02-17 RX ADMIN — CEFAZOLIN SODIUM 2000 MG: 1 INJECTION, POWDER, FOR SOLUTION INTRAMUSCULAR; INTRAVENOUS at 12:40

## 2020-02-17 RX ADMIN — DOCUSATE SODIUM 100 MG: 100 CAPSULE, LIQUID FILLED ORAL at 20:57

## 2020-02-17 RX ADMIN — ACETAMINOPHEN 650 MG: 325 TABLET ORAL at 01:24

## 2020-02-17 RX ADMIN — ONDANSETRON 4 MG: 2 INJECTION INTRAMUSCULAR; INTRAVENOUS at 13:02

## 2020-02-17 RX ADMIN — BUPIVACAINE HYDROCHLORIDE 5 ML: 5 INJECTION, SOLUTION EPIDURAL; INTRACAUDAL at 09:53

## 2020-02-17 RX ADMIN — SODIUM CHLORIDE, POTASSIUM CHLORIDE, SODIUM LACTATE AND CALCIUM CHLORIDE: 600; 310; 30; 20 INJECTION, SOLUTION INTRAVENOUS at 12:04

## 2020-02-17 RX ADMIN — MIDAZOLAM 1 MG: 1 INJECTION INTRAMUSCULAR; INTRAVENOUS at 12:56

## 2020-02-17 RX ADMIN — SODIUM CHLORIDE, POTASSIUM CHLORIDE, SODIUM LACTATE AND CALCIUM CHLORIDE: 600; 310; 30; 20 INJECTION, SOLUTION INTRAVENOUS at 00:00

## 2020-02-17 RX ADMIN — SODIUM CHLORIDE, SODIUM LACTATE, POTASSIUM CHLORIDE, AND CALCIUM CHLORIDE: .6; .31; .03; .02 INJECTION, SOLUTION INTRAVENOUS at 04:54

## 2020-02-17 RX ADMIN — Medication 12 ML/HR: at 05:46

## 2020-02-17 RX ADMIN — MIDAZOLAM 1 MG: 1 INJECTION INTRAMUSCULAR; INTRAVENOUS at 12:46

## 2020-02-17 RX ADMIN — LIDOCAINE HYDROCHLORIDE AND EPINEPHRINE 3 ML: 15; 5 INJECTION, SOLUTION EPIDURAL at 05:26

## 2020-02-17 RX ADMIN — KETOROLAC TROMETHAMINE 30 MG: 30 INJECTION, SOLUTION INTRAMUSCULAR at 13:32

## 2020-02-17 ASSESSMENT — PULMONARY FUNCTION TESTS
PIF_VALUE: 0

## 2020-02-17 ASSESSMENT — PAIN SCALES - GENERAL
PAINLEVEL_OUTOF10: 6
PAINLEVEL_OUTOF10: 2
PAINLEVEL_OUTOF10: 2

## 2020-02-17 ASSESSMENT — PAIN DESCRIPTION - DESCRIPTORS
DESCRIPTORS: CRAMPING
DESCRIPTORS: CRAMPING

## 2020-02-17 NOTE — ANESTHESIA PRE PROCEDURE
butorphanol (STADOL) injection 1 mg  1 mg Intravenous Q2H PRN Kathleen Gonzalez MD        fluconazole (DIFLUCAN) tablet 150 mg  150 mg Oral Once PRN Kathleen Gonzalez MD           Allergies:  No Known Allergies    Problem List:    Patient Active Problem List   Diagnosis Code    Encounter for elective induction of labor Z34.90       Past Medical History:        Diagnosis Date    Eczema        Past Surgical History:  History reviewed. No pertinent surgical history. Social History:    Social History     Tobacco Use    Smoking status: Former Smoker    Smokeless tobacco: Never Used   Substance Use Topics    Alcohol use: Not Currently     Alcohol/week: 0.0 standard drinks     Comment: rarely                                Counseling given: Not Answered      Vital Signs (Current):   Vitals:    02/16/20 1744 02/16/20 1923 02/16/20 1924 02/16/20 2015   BP: 138/69  125/73 129/74   Pulse: 86  108 91   Resp:   16 14   Temp:  36.2 °C (97.2 °F)  36.4 °C (97.6 °F)   TempSrc:  Temporal  Oral   Weight:       Height:                                                  BP Readings from Last 3 Encounters:   02/16/20 129/74   07/12/19 110/72   06/13/19 (!) 117/55       NPO Status: Time of last liquid consumption: 1630                        Time of last solid consumption: 1630                        Date of last liquid consumption: 02/16/20                        Date of last solid food consumption: 02/16/20    BMI:   Wt Readings from Last 3 Encounters:   02/16/20 240 lb (108.9 kg)   07/12/19 168 lb (76.2 kg)   06/13/19 169 lb 15.6 oz (77.1 kg)     Body mass index is 37.59 kg/m².     CBC:   Lab Results   Component Value Date    WBC 14.2 02/16/2020    RBC 4.50 02/16/2020    HGB 12.5 02/16/2020    HCT 37.3 02/16/2020    MCV 83.0 02/16/2020    RDW 13.5 02/16/2020     02/16/2020       CMP:   Lab Results   Component Value Date     06/13/2019    K 3.9 06/13/2019     06/13/2019    CO2 19 06/13/2019    BUN 8 06/13/2019 Live Births                    Julia Mackay is a 25y.o. year-old female admitted to Latoya Rincon MD for scheduled IOL for post dates. Gestational age is 41w0d. Her Body mass index is 37.59 kg/m². She was seen, examined and her chart was reviewed (including anesthesia, drug and allergy history). No interval changes are noted to her history and physical examination. (except as noted above).     Risks/benefits/alternatives of both neuraxial and general anesthesia were discussed and she agrees to proceed at the direction of the care team.    TIFFANIE Villarreal CRNA  February 16, 2020  9:45 PM        TIFFANIE Villarreal CRNA   2/16/2020

## 2020-02-17 NOTE — FLOWSHEET NOTE
1545- Patient recovery complete. Nakia care given and new pads placed under patient. Patient transferred to room 2260 on PP via bed, Patient and spouse oriented to surroundings. Call light in reach.

## 2020-02-17 NOTE — ANESTHESIA PROCEDURE NOTES
Epidural Block    Patient location during procedure: OB  Start time: 2/17/2020 5:04 AM  End time: 2/17/2020 5:47 AM  Reason for block: labor epidural  Staffing  Anesthesiologist: Adelina Mcgarry MD  Resident/CRNA: TIFFANIE Hidalgo - CRNA  Performed: resident/CRNA   Preanesthetic Checklist  Completed: patient identified, site marked, surgical consent, pre-op evaluation, timeout performed, IV checked, risks and benefits discussed, monitors and equipment checked, anesthesia consent given, oxygen available and patient being monitored  Epidural  Patient position: sitting  Prep: ChloraPrep and site prepped and draped  Patient monitoring: continuous pulse ox and frequent blood pressure checks  Approach: midline  Location: lumbar (1-5)  Injection technique: YURI saline  Provider prep: mask and sterile gloves  Needle  Needle type: Tuohy   Needle gauge: 17 G  Needle length: 3.5 in  Needle insertion depth: 8 cm  Catheter type: side hole  Catheter size: 20 G. Catheter at skin depth: 14 cm  Test dose: negative  Assessment  Sensory level: T10  Hemodynamics: stable  Attempts: 3+  Additional Notes  Consent:  Risks and benefits of the labor epidural were discussed and the patient was given the opportunity to ask questions. Informed consent was obtained. Timeout performed immediately prior to start of the procedure. Skin wheal with Lidocaine 1% 3 mL @ L3-L4. Loss of resistance with 3 mL of normal saline to expand the epidural space. complains of paresthesia on right side, transient. CSF  negative, Blood: negative. Test dose negative with (3ml 1. 5%Lidocaine with 1:200,000 Epinephrine)  Occlusive dressing; steri strips, tegaderm and tape applied using aseptic technique. Attempts: 3   Difficulties/Complications: None    The patient was returned to the supine position with her head of bed elevated 30 degrees and left uterine displacement. She tolerated the epidural placement and dosing well.  RN remains at bedside to

## 2020-02-17 NOTE — PLAN OF CARE
Problem: Mood - Altered:  Goal: Mood stable  Description  Mood stable  Outcome: Met This Shift     Problem: Nausea/Vomiting:  Goal: Absence of nausea/vomiting  Description  Absence of nausea/vomiting  Outcome: Met This Shift

## 2020-02-17 NOTE — ANESTHESIA POSTPROCEDURE EVALUATION
Department of Anesthesiology  Postprocedure Note    Patient: Bonita Newman  MRN: [id number]  YOB: 1995  Date of evaluation: 2/17/2020  Time:  1:44 PM     Procedure Summary     Date:  02/17/20 Room / Location:      Anesthesia Start:  0454 Anesthesia Stop:  1344    Procedure:  Labor Analgesia Diagnosis:      Scheduled Providers:   Responsible Provider:  Bernie Stratton MD    Anesthesia Type:  epidural ASA Status:  2          Anesthesia Type: epidural    Monica Phase I:      Monica Phase II:      Last vitals: Reviewed and per EMR flowsheets.        Anesthesia Post Evaluation    Patient location during evaluation: PACU  Patient participation: complete - patient participated  Level of consciousness: awake and alert  Pain score: 3  Airway patency: patent  Nausea & Vomiting: no vomiting and no nausea  Complications: no  Cardiovascular status: hemodynamically stable  Respiratory status: spontaneous ventilation and room air  Hydration status: stable  Comments: BP (!) 99/54   Pulse 92   Temp 36.9 °C (98.5 °F) (Oral)   Resp 18   Ht 5' 7\" (1.702 m)   Wt 240 lb (108.9 kg)   LMP 05/06/2019   BMI 37.59 kg/m²

## 2020-02-17 NOTE — FLOWSHEET NOTE
Dr Darcy Waters at nurses station. Fetal monitor strip reviewed. Orders received to decrease Pitocin to 5 mu and administer an amnioinfusion. 1007- Amnioinfusion started. Fluid returned observed  1013- Patient repositioned to far right with peanut ball.

## 2020-02-17 NOTE — PLAN OF CARE
Problem: Anxiety:  Goal: Level of anxiety will decrease  Description  Level of anxiety will decrease  Outcome: Completed     Problem: Breathing Pattern - Ineffective:  Goal: Able to breathe comfortably  Description  Able to breathe comfortably  Outcome: Completed     Problem: Fluid Volume - Imbalance:  Goal: Absence of imbalanced fluid volume signs and symptoms  Description  Absence of imbalanced fluid volume signs and symptoms  Outcome: Completed  Goal: Absence of intrapartum hemorrhage signs and symptoms  Description  Absence of intrapartum hemorrhage signs and symptoms  Outcome: Completed     Problem: Infection - Intrapartum Infection:  Goal: Will show no infection signs and symptoms  Description  Will show no infection signs and symptoms  Outcome: Completed     Problem: Labor Process - Prolonged:  Goal: Labor progression, first stage, within specified pattern  Description  Labor progression, first stage, within specified pattern  Outcome: Completed  Goal: Labor progession, second stage, within specified pattern  Description  Labor progession, second stage, within specified pattern  Outcome: Completed  Goal: Uterine contractions within specified parameters  Description  Uterine contractions within specified parameters  Outcome: Completed     Problem:  Screening:  Goal: Ability to make informed decisions regarding treatment has improved  Description  Ability to make informed decisions regarding treatment has improved  Outcome: Completed     Problem: Pain - Acute:  Goal: Pain level will decrease  Description  Pain level will decrease  Outcome: Completed  Goal: Able to cope with pain  Description  Able to cope with pain  Outcome: Completed     Problem: Tissue Perfusion - Uteroplacental, Altered:  Goal: Absence of abnormal fetal heart rate pattern  Description  Absence of abnormal fetal heart rate pattern  Outcome: Completed     Problem: Urinary Retention:  Goal: Experiences of bladder distention will decrease  Description  Experiences of bladder distention will decrease  Outcome: Completed  Goal: Urinary elimination within specified parameters  Description  Urinary elimination within specified parameters  Outcome: Completed     Problem: Pain:  Goal: Pain level will decrease  Description  Pain level will decrease  Outcome: Completed  Goal: Control of acute pain  Description  Control of acute pain  Outcome: Completed  Goal: Control of chronic pain  Description  Control of chronic pain  Outcome: Completed

## 2020-02-17 NOTE — H&P
Department of Obstetrics and Gynecology   Obstetrics History and Physical        CHIEF COMPLAINT:  Postdates IOL    HISTORY OF PRESENT ILLNESS:    Deann Hawkins  is a 25 y.o.  female at 37w0d presents with a chief complaint as above and is being admitted for postdates IOL    Estimated Due Date: Estimated Date of Delivery: 20    PRENATAL CARE: Complicated by: +THC, posterior low-lying placenta    PAST OB HISTORY:  OB History        1    Para        Term                AB        Living           SAB        TAB        Ectopic        Molar        Multiple        Live Births                  Past Medical History:        Diagnosis Date    Eczema      Past Surgical History:    History reviewed. No pertinent surgical history. Allergies:  Patient has no known allergies.   Social History:    Social History     Socioeconomic History    Marital status: Single     Spouse name: Not on file    Number of children: Not on file    Years of education: Not on file    Highest education level: Not on file   Occupational History    Not on file   Social Needs    Financial resource strain: Not on file    Food insecurity:     Worry: Not on file     Inability: Not on file    Transportation needs:     Medical: Not on file     Non-medical: Not on file   Tobacco Use    Smoking status: Former Smoker    Smokeless tobacco: Never Used   Substance and Sexual Activity    Alcohol use: Not Currently     Alcohol/week: 0.0 standard drinks     Comment: rarely    Drug use: No    Sexual activity: Yes     Partners: Male   Lifestyle    Physical activity:     Days per week: Not on file     Minutes per session: Not on file    Stress: Not on file   Relationships    Social connections:     Talks on phone: Not on file     Gets together: Not on file     Attends Anabaptist service: Not on file     Active member of club or organization: Not on file     Attends meetings of clubs or organizations: Not on file     Relationship status: Not on file    Intimate partner violence:     Fear of current or ex partner: Not on file     Emotionally abused: Not on file     Physically abused: Not on file     Forced sexual activity: Not on file   Other Topics Concern    Not on file   Social History Narrative    Not on file     Family History:       Problem Relation Age of Onset    Diabetes Maternal Grandmother     Heart Disease Maternal Grandmother     Diabetes Paternal Grandmother     Cancer Paternal Grandmother         lung and colon    Asthma Father     Drug Abuse Maternal Grandfather      Medications Prior to Admission:  Medications Prior to Admission: Prenatal Vit-Fe Fumarate-FA (PRENATAL VITAMIN) 27-0.8 MG TABS, Take 1 tablet by mouth daily  traZODone (DESYREL) 50 MG tablet, TAKE 1 TABLET BY MOUTH AT BEDTIME  hydrOXYzine (ATARAX) 25 MG tablet, Take 25 mg by mouth 3 times daily as needed for Anxiety  QUEtiapine (SEROQUEL) 50 MG tablet, Take 1 tablet by mouth nightly  escitalopram (LEXAPRO) 20 MG tablet, Take 1 tablet by mouth daily    REVIEW OF SYSTEMS:  negative     PHYSICAL EXAM:    Vitals:    02/16/20 1721 02/16/20 1744 02/16/20 1923 02/16/20 1924   BP: (!) 143/67 138/69  125/73   Pulse: 91 86  108   Resp: 14   16   Temp: 96.8 °F (36 °C)  97.2 °F (36.2 °C)    TempSrc: Temporal  Temporal    Weight:       Height:         General appearance:  awake, alert, cooperative, no apparent distress, and appears stated age  Neurologic:  Awake, alert, oriented to name, place and time.     Lungs:  No increased work of breathing, good air exchange  Abdomen:  Soft, non tender, gravid, fundal height consistent with the gestational age, EFW by Leopold's maneuvers is AGA  Pelvis:  Adequate pelvis  Cervix: FT/60/-3  Contraction frequency: every 0 minutes  Membranes:  Intact  Labs:   CBC:   Lab Results   Component Value Date    WBC 14.2 02/16/2020    RBC 4.50 02/16/2020    HGB 12.5 02/16/2020    HCT 37.3 02/16/2020    MCV 83.0 02/16/2020    MCH 27.7 02/16/2020    MCHC 33.4 02/16/2020    RDW 13.5 02/16/2020     02/16/2020    MPV 7.3 02/16/2020       ASSESSMENT:  <principal problem not specified>    PLAN: Admit  Labor: Routine labor orders  Fetus: Reassuring  GBS: Negative    Electronically signed by Darnell Cruz MD on 2/16/2020 at 7:31 PM

## 2020-02-17 NOTE — FLOWSHEET NOTE
36- Dr. Juan Palmer at bedside checking on patient status. Fetal monitor strip reviewed. SVE \" no cervical change since 0500\". 1209- Pitocin off. Provider discussing risks and benefits of primary  section. 1210- Decision to do Primary c/s for failure to progress agreed upon with patient and spouse    1223-IUPC removed. Patient clipped and frederic wiped prior to being taken back to OR.  Pre- op meds given see MAR.  8182- Patient transferred to OR 2 via bed   1238- Patient in [address] for procedure

## 2020-02-18 LAB
HCT VFR BLD CALC: 30.7 % (ref 36–48)
HEMOGLOBIN: 10.1 G/DL (ref 12–16)
MCH RBC QN AUTO: 27.9 PG (ref 26–34)
MCHC RBC AUTO-ENTMCNC: 33.1 G/DL (ref 31–36)
MCV RBC AUTO: 84.5 FL (ref 80–100)
PDW BLD-RTO: 13.8 % (ref 12.4–15.4)
PLATELET # BLD: 211 K/UL (ref 135–450)
PMV BLD AUTO: 6.8 FL (ref 5–10.5)
RBC # BLD: 3.63 M/UL (ref 4–5.2)
WBC # BLD: 13.1 K/UL (ref 4–11)

## 2020-02-18 PROCEDURE — 6370000000 HC RX 637 (ALT 250 FOR IP): Performed by: OBSTETRICS & GYNECOLOGY

## 2020-02-18 PROCEDURE — 1220000000 HC SEMI PRIVATE OB R&B

## 2020-02-18 PROCEDURE — 2580000003 HC RX 258: Performed by: OBSTETRICS & GYNECOLOGY

## 2020-02-18 PROCEDURE — 85027 COMPLETE CBC AUTOMATED: CPT

## 2020-02-18 PROCEDURE — 94761 N-INVAS EAR/PLS OXIMETRY MLT: CPT

## 2020-02-18 PROCEDURE — 6360000002 HC RX W HCPCS: Performed by: OBSTETRICS & GYNECOLOGY

## 2020-02-18 PROCEDURE — 6360000002 HC RX W HCPCS: Performed by: ANESTHESIOLOGY

## 2020-02-18 PROCEDURE — 36415 COLL VENOUS BLD VENIPUNCTURE: CPT

## 2020-02-18 RX ADMIN — DOCUSATE SODIUM 100 MG: 100 CAPSULE, LIQUID FILLED ORAL at 22:08

## 2020-02-18 RX ADMIN — ENOXAPARIN SODIUM 40 MG: 40 INJECTION SUBCUTANEOUS at 02:00

## 2020-02-18 RX ADMIN — KETOROLAC TROMETHAMINE 30 MG: 30 INJECTION, SOLUTION INTRAMUSCULAR at 01:57

## 2020-02-18 RX ADMIN — OXYCODONE HYDROCHLORIDE AND ACETAMINOPHEN 2 TABLET: 5; 325 TABLET ORAL at 17:27

## 2020-02-18 RX ADMIN — Medication 10 ML: at 08:05

## 2020-02-18 RX ADMIN — IBUPROFEN 800 MG: 400 TABLET ORAL at 22:09

## 2020-02-18 RX ADMIN — SODIUM CHLORIDE, POTASSIUM CHLORIDE, SODIUM LACTATE AND CALCIUM CHLORIDE: 600; 310; 30; 20 INJECTION, SOLUTION INTRAVENOUS at 04:41

## 2020-02-18 RX ADMIN — OXYCODONE HYDROCHLORIDE AND ACETAMINOPHEN 1 TABLET: 5; 325 TABLET ORAL at 13:35

## 2020-02-18 RX ADMIN — OXYCODONE HYDROCHLORIDE AND ACETAMINOPHEN 2 TABLET: 5; 325 TABLET ORAL at 22:09

## 2020-02-18 RX ADMIN — IBUPROFEN 800 MG: 400 TABLET ORAL at 16:18

## 2020-02-18 RX ADMIN — KETOROLAC TROMETHAMINE 30 MG: 30 INJECTION, SOLUTION INTRAMUSCULAR at 08:05

## 2020-02-18 RX ADMIN — DOCUSATE SODIUM 100 MG: 100 CAPSULE, LIQUID FILLED ORAL at 08:05

## 2020-02-18 ASSESSMENT — PAIN SCALES - GENERAL
PAINLEVEL_OUTOF10: 4
PAINLEVEL_OUTOF10: 2
PAINLEVEL_OUTOF10: 5
PAINLEVEL_OUTOF10: 5
PAINLEVEL_OUTOF10: 4

## 2020-02-18 ASSESSMENT — PAIN DESCRIPTION - DESCRIPTORS
DESCRIPTORS: SORE
DESCRIPTORS: SORE

## 2020-02-18 NOTE — FLOWSHEET NOTE
Patient sitting up in chair, FOB at the bedside holding infant. Patient denies needs at this time. Call light with in reach will continue to monitor.

## 2020-02-18 NOTE — FLOWSHEET NOTE
Mom resting in bed. IV infusing well, Ribeiro in place, Boots on bilaterally. Encouraged to drink fluids. Dressing dry and intact. Breast feeding at intervals.

## 2020-02-18 NOTE — FLOWSHEET NOTE
Mom returned to bed at [place] [address]. Tolerated well. Boots in place, IV infusing well, bleeding is mild. Drinking water. Resting after assessment completed.

## 2020-02-18 NOTE — PLAN OF CARE
Problem: Fluid Volume - Imbalance:  Goal: Absence of postpartum hemorrhage signs and symptoms  Description  Absence of postpartum hemorrhage signs and symptoms  2/18/2020 0553 by Angelica Gandhi RN  Outcome: Met This Shift  Note:   Bleeding has been mild with no sign of clots or odor     Problem: Fluid Volume - Imbalance:  Goal: Absence of imbalanced fluid volume signs and symptoms  Description  Absence of imbalanced fluid volume signs and symptoms  Outcome: Met This Shift  Note:   IV fluids are infusing as ordered. Ribeiro draining momo clear urine. Is also drinking water fairly well. Problem: Infection - Surgical Site:  Goal: Will show no infection signs and symptoms  Description  Will show no infection signs and symptoms  2/18/2020 0553 by Angelica Gandhi RN  Outcome: Met This Shift  Note:   Dressing dry and intact, with no odor or drainage. Problem: Mood - Altered:  Goal: Mood stable  Description  Mood stable  2/18/2020 0553 by Angelica Gandhi RN  Outcome: Met This Shift  Note:   Has been calm and cooperative. Has been independent with breast feeding infant     Problem: Nausea/Vomiting:  Goal: Absence of nausea/vomiting  Description  Absence of nausea/vomiting  2/18/2020 0553 by Angelica Gandhi RN  Outcome: Met This Shift     Problem: Urinary Retention:  Goal: Urinary elimination within specified parameters  Description  Urinary elimination within specified parameters  2/18/2020 0553 by Angelica Gandhi RN  Outcome: Met This Shift  Note:   Ribeiro in place and draining adequate amount of urine. 2/17/2020 1824 by Tyler Abad RN  Outcome: Ongoing     Problem: Venous Thromboembolism:  Goal: Will show no signs or symptoms of venous thromboembolism  Description  Will show no signs or symptoms of venous thromboembolism  2/18/2020 0553 by Angelica Gandhi RN  Outcome: Met This Shift  Note:   Has been up in chair this shift. While in bed, bilateral boots have been in place.      Problem: Venous Thromboembolism:  Goal: Absence of signs or symptoms of impaired coagulation  Description  Absence of signs or symptoms of impaired coagulation  2/18/2020 0553 by Stefano Steinberg RN  Outcome: Met This Shift     Problem: Discharge Planning:  Goal: Discharged to appropriate level of care  Description  Discharged to appropriate level of care  2/18/2020 0553 by Stefano Steinberg RN  Outcome: Ongoing     Problem: Pain - Acute:  Goal: Pain level will decrease  Description  Pain level will decrease  2/18/2020 0553 by Stefano Steinberg RN  Outcome: Ongoing  Note:   Does have some discomfort from her surgical incision. Pain med given as ordered.

## 2020-02-19 PROCEDURE — 1220000000 HC SEMI PRIVATE OB R&B

## 2020-02-19 PROCEDURE — 6370000000 HC RX 637 (ALT 250 FOR IP): Performed by: OBSTETRICS & GYNECOLOGY

## 2020-02-19 PROCEDURE — 6360000002 HC RX W HCPCS: Performed by: OBSTETRICS & GYNECOLOGY

## 2020-02-19 RX ADMIN — IBUPROFEN 800 MG: 400 TABLET ORAL at 05:01

## 2020-02-19 RX ADMIN — DOCUSATE SODIUM 100 MG: 100 CAPSULE, LIQUID FILLED ORAL at 08:41

## 2020-02-19 RX ADMIN — ENOXAPARIN SODIUM 40 MG: 40 INJECTION SUBCUTANEOUS at 08:41

## 2020-02-19 RX ADMIN — IBUPROFEN 800 MG: 400 TABLET ORAL at 21:12

## 2020-02-19 RX ADMIN — OXYCODONE HYDROCHLORIDE AND ACETAMINOPHEN 1 TABLET: 5; 325 TABLET ORAL at 09:25

## 2020-02-19 RX ADMIN — OXYCODONE HYDROCHLORIDE AND ACETAMINOPHEN 2 TABLET: 5; 325 TABLET ORAL at 05:01

## 2020-02-19 RX ADMIN — OXYCODONE HYDROCHLORIDE AND ACETAMINOPHEN 1 TABLET: 5; 325 TABLET ORAL at 21:12

## 2020-02-19 RX ADMIN — IBUPROFEN 800 MG: 400 TABLET ORAL at 13:20

## 2020-02-19 RX ADMIN — OXYCODONE HYDROCHLORIDE AND ACETAMINOPHEN 1 TABLET: 5; 325 TABLET ORAL at 15:35

## 2020-02-19 RX ADMIN — DOCUSATE SODIUM 100 MG: 100 CAPSULE, LIQUID FILLED ORAL at 21:12

## 2020-02-19 ASSESSMENT — PAIN SCALES - GENERAL
PAINLEVEL_OUTOF10: 6
PAINLEVEL_OUTOF10: 6
PAINLEVEL_OUTOF10: 5
PAINLEVEL_OUTOF10: 0
PAINLEVEL_OUTOF10: 2
PAINLEVEL_OUTOF10: 4

## 2020-02-19 ASSESSMENT — PAIN DESCRIPTION - ORIENTATION: ORIENTATION: MID

## 2020-02-19 ASSESSMENT — PAIN DESCRIPTION - ONSET: ONSET: GRADUAL

## 2020-02-19 ASSESSMENT — PAIN DESCRIPTION - PROGRESSION: CLINICAL_PROGRESSION: GRADUALLY WORSENING

## 2020-02-19 ASSESSMENT — PAIN - FUNCTIONAL ASSESSMENT
PAIN_FUNCTIONAL_ASSESSMENT: ACTIVITIES ARE NOT PREVENTED
PAIN_FUNCTIONAL_ASSESSMENT: ACTIVITIES ARE NOT PREVENTED

## 2020-02-19 ASSESSMENT — PAIN DESCRIPTION - PAIN TYPE: TYPE: ACUTE PAIN;SURGICAL PAIN

## 2020-02-19 ASSESSMENT — PAIN DESCRIPTION - FREQUENCY: FREQUENCY: INTERMITTENT

## 2020-02-19 ASSESSMENT — PAIN DESCRIPTION - DESCRIPTORS: DESCRIPTORS: ACHING;SORE

## 2020-02-19 ASSESSMENT — PAIN DESCRIPTION - LOCATION: LOCATION: INCISION;BREAST

## 2020-02-19 NOTE — DISCHARGE SUMMARY
Department of Obstetrics and Gynecology  Postpartum Discharge Summary      Admit Date: 2020    Admit Diagnosis: Encounter for elective induction of labor [Z34.90]    Discharge Date: 20    Discharge Diagnoses: Carmen Bates, [address] Medication Instructions VNE:[id number]    Printed on:20 1506   Medication Information                      escitalopram (LEXAPRO) 20 MG tablet  Take 1 tablet by mouth daily             hydrOXYzine (ATARAX) 25 MG tablet  Take 25 mg by mouth 3 times daily as needed for Anxiety             Prenatal Vit-Fe Fumarate-FA (PRENATAL VITAMIN) 27-0.8 MG TABS  Take 1 tablet by mouth daily             QUEtiapine (SEROQUEL) 50 MG tablet  Take 1 tablet by mouth nightly             traZODone (DESYREL) 50 MG tablet  TAKE 1 TABLET BY MOUTH AT BEDTIME                 Service: Obstetrics    Consults: none    Significant Diagnostic Studies: none    Postpartum complications: none     Condition at Discharge: good    Hospital Course: uncomplicated    Discharge Instructions: Activity: as tolerated    Diet: regular diet    Instructions: No intercourse and nothing in the vagina for 6 weeks. Do not drive while using pain medications.  Keep any wounds clean and dry    Discharge to: Home    Disposition / Follow up: Return to office in 6 weeks    Home Health Nurse visit within 24-48 h if qualifies    [place] Data:  Weight   Information for the patient's :  Logan Dawson [[id number]]        Apgars   Information for the patient's :  Logan Dawson [[id number]]         Home with mother    Electronically signed by Syed Castellanos MD on 2020 at 3:06 PM

## 2020-02-19 NOTE — LACTATION NOTE
This note was copied from a baby's chart. LC to room. Mother states infant cluster fed last night. Discussed obtaining at deep latch for optimal milk transfer. Encouraged mother to continue working on positioning and obtaining a deep latch to prevent soreness. I taught and mother returned demo of breast compressions to increase milk flow and reduce feeding duration. Wrote name and number on white board and encouraged mother to call for a feeding attempt today.

## 2020-02-19 NOTE — PROGRESS NOTES
[facility] Department of Anesthesiology  Post-Anesthesia Note       Name:  Melania Nguyen                                         Age:  25 y.o. MRN:  [id number]     Last Vitals & Oxygen Saturation: /62   Pulse 91   Temp 36.6 °C (97.9 °F) (Oral)   Resp 18   Ht 5' 7\" (1.702 m)   Wt 240 lb (108.9 kg)   LMP 05/06/2019   SpO2 97%   Breastfeeding Unknown   BMI 37.59 kg/m²   No data found.     Level of consciousness: awake, alert and oriented    Respiratory: stable     Cardiovascular: stable     Hydration: stable     PONV: stable     Post-op pain: adequate analgesia    Post-op assessment: no apparent anesthetic complications and tolerated procedure well    Complications:  none    TIFFANIE Moran CRNA  February 18, 2020   11:11 PM

## 2020-02-19 NOTE — PLAN OF CARE
Problem: Fluid Volume - Imbalance:  Goal: Absence of postpartum hemorrhage signs and symptoms  Description  Absence of postpartum hemorrhage signs and symptoms  Outcome: Met This Shift  Note:   Bleeding is mild with no clots or odor     Problem: Infection - Surgical Site:  Goal: Will show no infection signs and symptoms  Description  Will show no infection signs and symptoms  Outcome: Met This Shift  Note:   Incisional area clean and dry, steri strips in place     Problem: Mood - Altered:  Goal: Mood stable  Description  Mood stable  Outcome: Met This Shift  Note:   Was able to sleep for a few hours through the night and feels better after that. Is breast feeding infant frequently and is independent with latching her      Problem: Nausea/Vomiting:  Goal: Absence of nausea/vomiting  Description  Absence of nausea/vomiting  Outcome: Met This Shift     Problem: Urinary Retention:  Goal: Urinary elimination within specified parameters  Description  Urinary elimination within specified parameters  Outcome: Met This Shift     Problem: Venous Thromboembolism:  Goal: Will show no signs or symptoms of venous thromboembolism  Description  Will show no signs or symptoms of venous thromboembolism  Outcome: Met This Shift  Goal: Absence of signs or symptoms of impaired coagulation  Description  Absence of signs or symptoms of impaired coagulation  Outcome: Met This Shift     Problem: Discharge Planning:  Goal: Discharged to appropriate level of care  Description  Discharged to appropriate level of care  Outcome: Ongoing     Problem: Fluid Volume - Imbalance:  Goal: Absence of imbalanced fluid volume signs and symptoms  Description  Absence of imbalanced fluid volume signs and symptoms  Outcome: Ongoing  Note:   Drinking fluids and voiding well.   Does have some swelling in her lower extremities     Problem: Pain - Acute:  Goal: Pain level will decrease  Description  Pain level will decrease  Outcome: Ongoing  Note:   Does have

## 2020-02-19 NOTE — PLAN OF CARE
No drainage noted. No s/s of infection. 2/19/2020 9172 by Stefano Steinberg RN  Outcome: Met This Shift  Note:   Incisional area clean and dry, steri strips in place     Problem: Mood - Altered:  Goal: Mood stable  Description  Mood stable  2/19/2020 1554 by David Murrell RN  Outcome: Ongoing  Note:   Rosemarie Schafer is calm and cooperative w/ all cares. 2/19/2020 2133 by Stefano Steinberg RN  Outcome: Met This Shift  Note:   Was able to sleep for a few hours through the night and feels better after that. Is breast feeding infant frequently and is independent with latching her      Problem: Pain - Acute:  Goal: Pain level will decrease  Description  Pain level will decrease  2/19/2020 1554 by David Murrell RN  Outcome: Ongoing  Note:   Pain controlled w/ ibuprofen and prn percocet. 2/19/2020 1275 by Stefano Steinberg RN  Outcome: Ongoing  Note:   Does have pain from cramping and from her incision. Pain med given as ordered for relief.      Problem: Venous Thromboembolism:  Goal: Will show no signs or symptoms of venous thromboembolism  Description  Will show no signs or symptoms of venous thromboembolism  2/19/2020 1554 by David Murrell RN  Outcome: Ongoing  2/19/2020 0613 by Stefano Steinberg RN  Outcome: Met This Shift  Goal: Absence of signs or symptoms of impaired coagulation  Description  Absence of signs or symptoms of impaired coagulation  2/19/2020 1554 by David Murrell RN  Outcome: Ongoing  2/19/2020 0613 by Stefano Steinberg RN  Outcome: Met This Shift

## 2020-02-19 NOTE — CARE COORDINATION
JAIR met with patient/MOB in room to discuss discharge planning and home health visit. Patient/MOB states that she has all items needed for infant care including, car seat, crib, diapers, and bottles. Infants first pediatrician appointment has been scheduled for Monday at 3:40 PM with . JAIR discussed home health visit and offered options, patient declines. At this time patient does not indicate any other needs. SW available if discharge needs arise.   Electronically signed by Narayan Zarate on 2/19/2020 at 10:36 AM

## 2020-02-19 NOTE — FLOWSHEET NOTE
Mom up and about in room. Tolerating a regular diet and drinking fluids. Pain level is low. Breast feeding at intervals. Instructed mom to call with any questions or concerns. Verbalized understanding.

## 2020-02-19 NOTE — PROGRESS NOTES
Department of Obstetrics and Gynecology   Postpartum Rounds    SUBJECTIVE:  Pain is controlled with non-steroidal anti-inflammatory drugs or narcotic analgesics. The patient is tolerating regular diet. She is ambulating. Her lochia is normal.    OBJECTIVE:  Vital Signs: /65   Pulse 67   Temp 97.5 °F (36.4 °C) (Oral)   Resp 18   Ht 5' 7\" (1.702 m)   Wt 240 lb (108.9 kg)   LMP 2019   SpO2 97%   Breastfeeding Unknown   BMI 37.59 kg/m²   Appearance/Psychiatric: awake, alert, cooperative, no apparent distress, appears stated age  Constitutional: The patient is well nourished. Respiratory: Respiratory effort is normal.  Gastrointestinal: Soft, appropriately tender  The incision is intact      LABS / IMAGING:  CBC:   Lab Results   Component Value Date    WBC 13.1 2020    RBC 3.63 2020    HGB 10.1 2020    HCT 30.7 2020    MCV 84.5 2020    MCH 27.9 2020    MCHC 33.1 2020    RDW 13.8 2020     2020    MPV 6.8 2020       ASSESSMENT:    Postoperative Day 2 s/p LTCS     PLAN:   1. Advance postoperative care as tolerated  2. Discharge home on Postpartum Day 3  3. Return to office in 6 weeks   4.  Postpartum instructions reviewed and all patient's Questions answered    Electronically signed by Syed Castellanos MD on 2020 at 3:05 PM

## 2020-02-20 VITALS
HEART RATE: 76 BPM | DIASTOLIC BLOOD PRESSURE: 80 MMHG | SYSTOLIC BLOOD PRESSURE: 130 MMHG | WEIGHT: 240 LBS | OXYGEN SATURATION: 99 % | RESPIRATION RATE: 16 BRPM | TEMPERATURE: 97.4 F | BODY MASS INDEX: 37.67 KG/M2 | HEIGHT: 67 IN

## 2020-02-20 PROCEDURE — 6360000002 HC RX W HCPCS: Performed by: OBSTETRICS & GYNECOLOGY

## 2020-02-20 PROCEDURE — 6370000000 HC RX 637 (ALT 250 FOR IP): Performed by: OBSTETRICS & GYNECOLOGY

## 2020-02-20 RX ORDER — IBUPROFEN 800 MG/1
800 TABLET ORAL EVERY 6 HOURS PRN
Qty: 120 TABLET | Refills: 3 | COMMUNITY
Start: 2020-02-20 | End: 2021-04-23 | Stop reason: ALTCHOICE

## 2020-02-20 RX ORDER — OXYCODONE HYDROCHLORIDE AND ACETAMINOPHEN 5; 325 MG/1; MG/1
1 TABLET ORAL EVERY 4 HOURS PRN
Qty: 10 TABLET | Refills: 0
Start: 2020-02-20 | End: 2020-02-23

## 2020-02-20 RX ORDER — PSEUDOEPHEDRINE HCL 30 MG
100 TABLET ORAL 2 TIMES DAILY
COMMUNITY
Start: 2020-02-20 | End: 2021-04-23

## 2020-02-20 RX ADMIN — IBUPROFEN 800 MG: 400 TABLET ORAL at 09:52

## 2020-02-20 RX ADMIN — ENOXAPARIN SODIUM 40 MG: 40 INJECTION SUBCUTANEOUS at 09:52

## 2020-02-20 RX ADMIN — IBUPROFEN 800 MG: 400 TABLET ORAL at 03:18

## 2020-02-20 RX ADMIN — DOCUSATE SODIUM 100 MG: 100 CAPSULE, LIQUID FILLED ORAL at 09:52

## 2020-02-20 RX ADMIN — OXYCODONE HYDROCHLORIDE AND ACETAMINOPHEN 1 TABLET: 5; 325 TABLET ORAL at 03:19

## 2020-02-20 ASSESSMENT — PAIN DESCRIPTION - PROGRESSION: CLINICAL_PROGRESSION: GRADUALLY WORSENING

## 2020-02-20 ASSESSMENT — PAIN DESCRIPTION - LOCATION: LOCATION: ABDOMEN;INCISION

## 2020-02-20 ASSESSMENT — PAIN DESCRIPTION - ONSET: ONSET: GRADUAL

## 2020-02-20 ASSESSMENT — PAIN SCALES - GENERAL
PAINLEVEL_OUTOF10: 0
PAINLEVEL_OUTOF10: 5
PAINLEVEL_OUTOF10: 4

## 2020-02-20 ASSESSMENT — PAIN DESCRIPTION - PAIN TYPE: TYPE: ACUTE PAIN

## 2020-02-20 ASSESSMENT — PAIN DESCRIPTION - FREQUENCY: FREQUENCY: INTERMITTENT

## 2020-02-20 ASSESSMENT — PAIN DESCRIPTION - ORIENTATION: ORIENTATION: MID

## 2020-02-20 ASSESSMENT — PAIN DESCRIPTION - DESCRIPTORS: DESCRIPTORS: ACHING;SORE;CRAMPING

## 2020-02-20 NOTE — PLAN OF CARE
Problem: Discharge Planning:  Goal: Discharged to appropriate level of care  Description  Discharged to appropriate level of care  Outcome: Met This Shift  Note:   Lucille Roman is planning on going home today. Problem: Fluid Volume - Imbalance:  Goal: Absence of postpartum hemorrhage signs and symptoms  Description  Absence of postpartum hemorrhage signs and symptoms  Outcome: Met This Shift  Goal: Absence of imbalanced fluid volume signs and symptoms  Description  Absence of imbalanced fluid volume signs and symptoms  Outcome: Met This Shift     Problem: Infection - Surgical Site:  Goal: Will show no infection signs and symptoms  Description  Will show no infection signs and symptoms  Outcome: Met This Shift     Problem: Mood - Altered:  Goal: Mood stable  Description  Mood stable  Outcome: Met This Shift  Note:   Mood fluctuating with Nando not breast feeding as well as Mom feels she should. Discussed moods, hormones, utilizing support system and rest as needed. Problem: Nausea/Vomiting:  Goal: Absence of nausea/vomiting  Description  Absence of nausea/vomiting  Outcome: Met This Shift     Problem: Pain - Acute:  Goal: Pain level will decrease  Description  Pain level will decrease  Outcome: Met This Shift  Note:   Managing pain with Ibuprofen and Percocet.      Problem: Urinary Retention:  Goal: Urinary elimination within specified parameters  Description  Urinary elimination within specified parameters  Outcome: Met This Shift     Problem: Venous Thromboembolism:  Goal: Will show no signs or symptoms of venous thromboembolism  Description  Will show no signs or symptoms of venous thromboembolism  Outcome: Met This Shift  Goal: Absence of signs or symptoms of impaired coagulation  Description  Absence of signs or symptoms of impaired coagulation  Outcome: Met This Shift

## 2020-02-20 NOTE — LACTATION NOTE
This note was copied from a baby's chart. LC to room. Mother just finished breastfeeding infant at this time. LC discussed in length the temporary use of a nipple shield with mother. LC gave and reviewed Care Plan with mother. LC reviewed cleaning, application, and risks and benefits including possible impaired milk intake by infant and impaired milk production of mother. [facility] educated mother to use a breast pump after each feeding with nipple shield. LC stressed the importance of follow up with her pediatrician and Lactation. LC gave instructions and tips on weaning from nipple shield within 1-2 weeks. Mother states she is attempting to latch infant again without use of nipple shield, states nipples where sore and wanted to give them a break. LC reinforced importance of positioning infant nose to nipple, belly to belly, waiting for wide open mouth, and bringing baby onto breast to ensure a deep latch. LC gave handout on reverse pressure softening for improving latch when engorged. Discussed massage, hand expression and good positioning/latching with every feeding attempt. [facility] taught mother breast compressions to aide in milk removal and feeding duration. LC discussed engorgement, when to pump and when to begin offering bottle to infant. Mother denies any further needs at this time. [facility] updated whiteboard and encouraged mother to call for feeding before discharge to observe positioning and latch. Mother agreed.

## 2021-04-23 ENCOUNTER — OFFICE VISIT (OUTPATIENT)
Dept: PRIMARY CARE CLINIC | Age: 26
End: 2021-04-23
Payer: COMMERCIAL

## 2021-04-23 VITALS
OXYGEN SATURATION: 99 % | BODY MASS INDEX: 33.61 KG/M2 | WEIGHT: 214.6 LBS | TEMPERATURE: 97.1 F | HEART RATE: 80 BPM | SYSTOLIC BLOOD PRESSURE: 106 MMHG | DIASTOLIC BLOOD PRESSURE: 68 MMHG

## 2021-04-23 DIAGNOSIS — F41.9 ANXIETY AND DEPRESSION: ICD-10-CM

## 2021-04-23 DIAGNOSIS — F32.A ANXIETY AND DEPRESSION: Primary | ICD-10-CM

## 2021-04-23 DIAGNOSIS — F32.A ANXIETY AND DEPRESSION: ICD-10-CM

## 2021-04-23 DIAGNOSIS — F41.9 ANXIETY AND DEPRESSION: Primary | ICD-10-CM

## 2021-04-23 LAB
A/G RATIO: 1.5 (ref 1.1–2.2)
ALBUMIN SERPL-MCNC: 4.3 G/DL (ref 3.4–5)
ALP BLD-CCNC: 80 U/L (ref 40–129)
ALT SERPL-CCNC: 25 U/L (ref 10–40)
ANION GAP SERPL CALCULATED.3IONS-SCNC: 9 MMOL/L (ref 3–16)
AST SERPL-CCNC: 18 U/L (ref 15–37)
BILIRUB SERPL-MCNC: <0.2 MG/DL (ref 0–1)
BILIRUBIN URINE: NEGATIVE
BLOOD, URINE: NEGATIVE
BUN BLDV-MCNC: 12 MG/DL (ref 7–20)
CALCIUM SERPL-MCNC: 9.2 MG/DL (ref 8.3–10.6)
CHLORIDE BLD-SCNC: 103 MMOL/L (ref 99–110)
CLARITY: CLEAR
CO2: 26 MMOL/L (ref 21–32)
COLOR: YELLOW
CREAT SERPL-MCNC: 0.7 MG/DL (ref 0.6–1.1)
GFR AFRICAN AMERICAN: >60
GFR NON-AFRICAN AMERICAN: >60
GLOBULIN: 2.9 G/DL
GLUCOSE BLD-MCNC: 94 MG/DL (ref 70–99)
GLUCOSE URINE: NEGATIVE MG/DL
HCT VFR BLD CALC: 39 % (ref 36–48)
HEMOGLOBIN: 13.2 G/DL (ref 12–16)
KETONES, URINE: NEGATIVE MG/DL
LEUKOCYTE ESTERASE, URINE: NEGATIVE
MCH RBC QN AUTO: 27.7 PG (ref 26–34)
MCHC RBC AUTO-ENTMCNC: 33.9 G/DL (ref 31–36)
MCV RBC AUTO: 81.6 FL (ref 80–100)
MICROSCOPIC EXAMINATION: NORMAL
NITRITE, URINE: NEGATIVE
PDW BLD-RTO: 13.8 % (ref 12.4–15.4)
PH UA: 6 (ref 5–8)
PLATELET # BLD: 266 K/UL (ref 135–450)
PMV BLD AUTO: 8.3 FL (ref 5–10.5)
POTASSIUM SERPL-SCNC: 4.6 MMOL/L (ref 3.5–5.1)
PROTEIN UA: NEGATIVE MG/DL
RBC # BLD: 4.78 M/UL (ref 4–5.2)
SODIUM BLD-SCNC: 138 MMOL/L (ref 136–145)
SPECIFIC GRAVITY UA: 1.03 (ref 1–1.03)
TOTAL PROTEIN: 7.2 G/DL (ref 6.4–8.2)
TSH SERPL DL<=0.05 MIU/L-ACNC: 0.9 UIU/ML (ref 0.27–4.2)
URINE TYPE: NORMAL
UROBILINOGEN, URINE: 0.2 E.U./DL
WBC # BLD: 6.2 K/UL (ref 4–11)

## 2021-04-23 PROCEDURE — 99214 OFFICE O/P EST MOD 30 MIN: CPT | Performed by: FAMILY MEDICINE

## 2021-04-23 SDOH — ECONOMIC STABILITY: FOOD INSECURITY: WITHIN THE PAST 12 MONTHS, YOU WORRIED THAT YOUR FOOD WOULD RUN OUT BEFORE YOU GOT MONEY TO BUY MORE.: NEVER TRUE

## 2021-04-23 SDOH — ECONOMIC STABILITY: TRANSPORTATION INSECURITY
IN THE PAST 12 MONTHS, HAS THE LACK OF TRANSPORTATION KEPT YOU FROM MEDICAL APPOINTMENTS OR FROM GETTING MEDICATIONS?: NO

## 2021-04-23 SDOH — ECONOMIC STABILITY: INCOME INSECURITY: HOW HARD IS IT FOR YOU TO PAY FOR THE VERY BASICS LIKE FOOD, HOUSING, MEDICAL CARE, AND HEATING?: NOT HARD AT ALL

## 2021-04-23 ASSESSMENT — COLUMBIA-SUICIDE SEVERITY RATING SCALE - C-SSRS: 6. HAVE YOU EVER DONE ANYTHING, STARTED TO DO ANYTHING, OR PREPARED TO DO ANYTHING TO END YOUR LIFE?: NO

## 2021-04-23 ASSESSMENT — PATIENT HEALTH QUESTIONNAIRE - PHQ9
10. IF YOU CHECKED OFF ANY PROBLEMS, HOW DIFFICULT HAVE THESE PROBLEMS MADE IT FOR YOU TO DO YOUR WORK, TAKE CARE OF THINGS AT HOME, OR GET ALONG WITH OTHER PEOPLE: 0
SUM OF ALL RESPONSES TO PHQ QUESTIONS 1-9: 10
SUM OF ALL RESPONSES TO PHQ QUESTIONS 1-9: 8
SUM OF ALL RESPONSES TO PHQ9 QUESTIONS 1 & 2: 3
7. TROUBLE CONCENTRATING ON THINGS, SUCH AS READING THE NEWSPAPER OR WATCHING TELEVISION: 0
10. IF YOU CHECKED OFF ANY PROBLEMS, HOW DIFFICULT HAVE THESE PROBLEMS MADE IT FOR YOU TO DO YOUR WORK, TAKE CARE OF THINGS AT HOME, OR GET ALONG WITH OTHER PEOPLE: 1
1. LITTLE INTEREST OR PLEASURE IN DOING THINGS: 2
8. MOVING OR SPEAKING SO SLOWLY THAT OTHER PEOPLE COULD HAVE NOTICED. OR THE OPPOSITE, BEING SO FIGETY OR RESTLESS THAT YOU HAVE BEEN MOVING AROUND A LOT MORE THAN USUAL: 2
SUM OF ALL RESPONSES TO PHQ QUESTIONS 1-9: 10
SUM OF ALL RESPONSES TO PHQ9 QUESTIONS 1 & 2: 3
SUM OF ALL RESPONSES TO PHQ QUESTIONS 1-9: 8

## 2021-04-23 NOTE — LETTER
[facility]  [address] [address]  Phone: [phone]  Fax: [phone]    Shelia Viveros MD        April 23, 2021     Patient: Rioc Matos   YOB: 1995   Date of Visit: 4/23/2021       To Whom It May Concern: It is my medical opinion that Rico Matos is a patient of mine  Who was unable to complete her work day 4/22/2021 due to illness. If you have any questions or concerns, please don't hesitate to call.     Sincerely,        Shelia Viveros MD

## 2021-04-23 NOTE — PROGRESS NOTES
She is here for some anxiety and stress sxs that are recurrent at this time. Currently she has problems with eating and sleeping at this time. Duration is for about a year. She does feel depressed at times. No suicidal ideation. She thinks she is stressed out and it is work related. Trouble focusing . She works at [employer] as . She also has 3year old daughter. Family members help when she  Is at work. Speech is not pressured at this time. Mentally alert  No hallucinations or delusions    Last worked yesterday  Left 2 days early    No previous hosp. For this  Family history of depression  & anxiety and ADHD    Physical Exam  Constitutional:       General: She is not in acute distress. Appearance: She is well-developed. She is not diaphoretic. HENT:      Head: Normocephalic and atraumatic. Right Ear: External ear normal.      Left Ear: External ear normal.      Nose: Nose normal.      Mouth/Throat:      Pharynx: No oropharyngeal exudate. Eyes:      General: No scleral icterus. Left eye: No discharge. Conjunctiva/sclera: Conjunctivae normal.      Pupils: Pupils are equal, round, and reactive to light. Neck:      Musculoskeletal: Normal range of motion and neck supple. Thyroid: No thyromegaly. Vascular: No JVD. Trachea: No tracheal deviation. Cardiovascular:      Rate and Rhythm: Normal rate and regular rhythm. Heart sounds: Normal heart sounds. No murmur. No friction rub. No gallop. Pulmonary:      Effort: Pulmonary effort is normal. No respiratory distress. Breath sounds: Normal breath sounds. No stridor. No wheezing or rales. Chest:      Chest wall: No tenderness. Abdominal:      General: Bowel sounds are normal. There is no distension. Palpations: Abdomen is soft. There is no mass. Tenderness: There is no abdominal tenderness. There is no guarding or rebound. Musculoskeletal: Normal range of motion. General: No tenderness. Lymphadenopathy:      Cervical: No cervical adenopathy. Skin:     General: Skin is warm and dry. Coloration: Skin is not pale. Findings: No erythema or rash. Neurological:      Mental Status: She is alert and oriented to person, place, and time. Cranial Nerves: No cranial nerve deficit. Coordination: Coordination normal.      Deep Tendon Reflexes: Reflexes are normal and symmetric. Reflexes normal.   Psychiatric:         Behavior: Behavior normal.         Thought Content: Thought content normal.         Judgment: Judgment normal.         1. Anxiety and depression  Job stress  Also has 1 yr old child    Has positive sc for depression  Ck labs, psych consult, start low dose SSRI  - CBC; Future  - TSH without Reflex; Future  - URINALYSIS  - Comprehensive Metabolic Panel; Future  - External Referral to Psychiatry  - sertraline (ZOLOFT) 50 MG tablet; 1/2 tab once a day for one week then 1/2 tab twice a day  Dispense: 90 tablet;  Refill: 1

## 2021-04-26 ENCOUNTER — TELEPHONE (OUTPATIENT)
Dept: PRIMARY CARE CLINIC | Age: 26
End: 2021-04-26

## 2022-07-18 NOTE — FLOWSHEET NOTE
Mom crying out with infant's attempts to latch. Mom has large breasts, not wearing nursing bra, holding infant on lap. Infant latches quickly, not deeply. Encouraged Mom to detach and re-latch infant with deeper latch to decrease pain, nipple damage and to be able to transfer milk. Assisted Mom until latch obtained. Encouraged Mom to wear nursing bra for support, to discourage infant sliding off to tip of nipple, and to continue seeking assistance with each feeding as needed. Refer to the Assessment tab to view/cancel completed assessment.

## 2024-02-27 ENCOUNTER — OFFICE VISIT (OUTPATIENT)
Dept: INTERNAL MEDICINE CLINIC | Age: 29
End: 2024-02-27
Payer: COMMERCIAL

## 2024-02-27 VITALS
TEMPERATURE: 98 F | WEIGHT: 163 LBS | SYSTOLIC BLOOD PRESSURE: 100 MMHG | OXYGEN SATURATION: 100 % | HEART RATE: 76 BPM | DIASTOLIC BLOOD PRESSURE: 60 MMHG | BODY MASS INDEX: 25.53 KG/M2

## 2024-02-27 DIAGNOSIS — Z00.00 PREVENTATIVE HEALTH CARE: ICD-10-CM

## 2024-02-27 DIAGNOSIS — Z76.89 ENCOUNTER TO ESTABLISH CARE: Primary | ICD-10-CM

## 2024-02-27 DIAGNOSIS — H61.21 IMPACTED CERUMEN, RIGHT EAR: ICD-10-CM

## 2024-02-27 PROCEDURE — 99213 OFFICE O/P EST LOW 20 MIN: CPT | Performed by: NURSE PRACTITIONER

## 2024-02-27 SDOH — ECONOMIC STABILITY: FOOD INSECURITY: WITHIN THE PAST 12 MONTHS, THE FOOD YOU BOUGHT JUST DIDN'T LAST AND YOU DIDN'T HAVE MONEY TO GET MORE.: NEVER TRUE

## 2024-02-27 SDOH — ECONOMIC STABILITY: HOUSING INSECURITY
IN THE LAST 12 MONTHS, WAS THERE A TIME WHEN YOU DID NOT HAVE A STEADY PLACE TO SLEEP OR SLEPT IN A SHELTER (INCLUDING NOW)?: NO

## 2024-02-27 SDOH — ECONOMIC STABILITY: INCOME INSECURITY: HOW HARD IS IT FOR YOU TO PAY FOR THE VERY BASICS LIKE FOOD, HOUSING, MEDICAL CARE, AND HEATING?: NOT HARD AT ALL

## 2024-02-27 SDOH — HEALTH STABILITY: PHYSICAL HEALTH: ON AVERAGE, HOW MANY DAYS PER WEEK DO YOU ENGAGE IN MODERATE TO STRENUOUS EXERCISE (LIKE A BRISK WALK)?: 5 DAYS

## 2024-02-27 SDOH — ECONOMIC STABILITY: FOOD INSECURITY: WITHIN THE PAST 12 MONTHS, YOU WORRIED THAT YOUR FOOD WOULD RUN OUT BEFORE YOU GOT MONEY TO BUY MORE.: NEVER TRUE

## 2024-02-27 ASSESSMENT — ENCOUNTER SYMPTOMS
SHORTNESS OF BREATH: 0
CONSTIPATION: 0

## 2024-02-27 ASSESSMENT — PATIENT HEALTH QUESTIONNAIRE - PHQ9
SUM OF ALL RESPONSES TO PHQ QUESTIONS 1-9: 0
2. FEELING DOWN, DEPRESSED OR HOPELESS: 0
SUM OF ALL RESPONSES TO PHQ QUESTIONS 1-9: 0
SUM OF ALL RESPONSES TO PHQ9 QUESTIONS 1 & 2: 0
1. LITTLE INTEREST OR PLEASURE IN DOING THINGS: 0

## 2024-02-27 NOTE — PROGRESS NOTES
Date: 2024                                               Subjective/Objective:     Chief Complaint   Patient presents with    Establish Care    Otalgia     Thinks that she may something stuck in her ear.       HPI    Leonora Sigala is a 27 yo female, visit today to establish care. Former patient of Kevin CHRISTY.     Thinks she has had something stuck in her ear for the last few years. Right ear feels clogged, sometimes it doesn't bother her but sometimes it does. Recently has become painful, has been unable to sleep on that side. She tried debrox drops without relief. She has noticed decreased hearing from the ear. No fever/chills.     Gyn: LMP: 2024 BCM: none.                Last pap smear: needs               GYN care managed by: Dr Palafox              Patient Active Problem List    Diagnosis Date Noted    Encounter for elective induction of labor 2020       Past Medical History:   Diagnosis Date    Eczema        Past Surgical History:   Procedure Laterality Date     SECTION N/A 2020    PRIMARY  SECTION with low transverse uterine incision @ 1301 performed by Gianna Palafox MD at [facility] L&D OR       Orders Only on 2021   Component Date Value Ref Range Status    Sodium 2021 138  136 - 145 mmol/L Final    Potassium 2021 4.6  3.5 - 5.1 mmol/L Final    Chloride 2021 103  99 - 110 mmol/L Final    CO2 2021 26  21 - 32 mmol/L Final    Anion Gap 2021 9  3 - 16 Final    Glucose 2021 94  70 - 99 mg/dL Final    BUN 2021 12  7 - 20 mg/dL Final    Creatinine 2021 0.7  0.6 - 1.1 mg/dL Final    GFR Non- 2021 >60  >60 Final    Comment: >60 mL/min/1.73m2 EGFR, calc. for ages 18 and older using the  MDRD formula (not corrected for weight), is valid for stable  renal function.      GFR  2021 >60  >60 Final    Comment: Chronic Kidney Disease: less than 60 ml/min/1.73 sq.m.          Kidney

## 2024-02-28 ENCOUNTER — OFFICE VISIT (OUTPATIENT)
Dept: ENT CLINIC | Age: 29
End: 2024-02-28
Payer: COMMERCIAL

## 2024-02-28 VITALS
HEART RATE: 90 BPM | HEIGHT: 67 IN | OXYGEN SATURATION: 100 % | DIASTOLIC BLOOD PRESSURE: 75 MMHG | SYSTOLIC BLOOD PRESSURE: 117 MMHG | BODY MASS INDEX: 25.53 KG/M2

## 2024-02-28 DIAGNOSIS — H73.20 MYRINGITIS: ICD-10-CM

## 2024-02-28 DIAGNOSIS — H61.21 IMPACTED CERUMEN OF RIGHT EAR: ICD-10-CM

## 2024-02-28 DIAGNOSIS — T16.1XXA ACUTE FOREIGN BODY OF RIGHT EAR CANAL, INITIAL ENCOUNTER: Primary | ICD-10-CM

## 2024-02-28 PROCEDURE — 99203 OFFICE O/P NEW LOW 30 MIN: CPT | Performed by: OTOLARYNGOLOGY

## 2024-02-28 PROCEDURE — 69200 CLEAR OUTER EAR CANAL: CPT | Performed by: OTOLARYNGOLOGY

## 2024-02-28 NOTE — PROGRESS NOTES
[facility]  DIVISION OF OTOLARYNGOLOGY- HEAD & NECK SURGERY  CONSULT      Patient Name: Leonora Sigala  Medical Record Number:  [id number]  Primary Care Physician:  Meena Steven APRN  Date of Consultation: 2/28/2024    Chief Complaint: Ear issues        HISTORY OF PRESENT ILLNESS  Leonora is a(n) 28 y.o. female who presents for evaluation of ear issues.  She said that the right ear has been plugged for a long time.  It is uncomfortable at times.  She cannot hear well out of the right side.  The left ear seems to be okay.  She has never had significant ear problems in the past.  No history of ear surgery.            REVIEW OF SYSTEMS    As above  PHYSICAL EXAM  GENERAL: No Acute Distress, Alert and Oriented, no Hoarseness, strong voice  EYES: EOMI, Anti-icteric  HENT:   Head: Normocephalic and atraumatic.   Face:  Symmetric, facial nerve intact, no sinus tenderness  Ear: See below  Mouth/Oral Cavity:  normal lips, Uvula is midline, no mucosal lesions, no trismus  Oropharynx/Larynx:  normal oropharynx  Nose:Normal external nasal appearance.    NECK: Normal range of motion, no thyromegaly, trachea is midline, no lymphadenopathy, no neck masses, no crepitus            PROCEDURE  Bilateral ear exam with removal of foreign body and wax  The right ear was visualized with the binocular scope.  A metallic foreign body was removed from the medial canal with alligator forceps.  She had a dense cerumen impaction pushed against tympanic membrane that I removed with a Alonzo suction.  She had quite a bit of erythema and slight drainage on the tympanic membrane itself, but the middle ear appeared to be aerated.  On the left side tympanic membrane intact aerated middle ear.        ASSESSMENT/PLAN  1. Acute foreign body of right ear canal, initial encounter  Removed today in clinic    2. Impacted cerumen of right ear  Removed today in clinic    3. Myringitis  Tympanic membrane is very irritated.  I will give her a round of

## (undated) DEVICE — BAG,SPONGE COUNTER,BLUE,50/BX,5BX/CS: Brand: MEDLINE

## (undated) DEVICE — SUTURE VCRL SZ 4-0 L18IN ABSRB UD L19MM PS-2 3/8 CIR PRIM J496H

## (undated) DEVICE — SOLUTION IV IRRIG POUR BRL 0.9% SODIUM CHL 2F7124

## (undated) DEVICE — PAD,ABDOMINAL,8"X7.5",STERILE,LF,1/PK: Brand: MEDLINE

## (undated) DEVICE — CATHETER TRAY 16 FR 5 CC FOL ANTIREFLX SAMPLING PRT DOVER

## (undated) DEVICE — GARMENT,MEDLINE,DVT,INT,CALF,LG, GEN2: Brand: MEDLINE

## (undated) DEVICE — SUTURE VCRL SZ 0 L36IN ABSRB UD CT-1 L36MM 1/2 CIR TAPR PNT VCP946H

## (undated) DEVICE — COVER LT HNDL BLU PLAS

## (undated) DEVICE — Device

## (undated) DEVICE — SUTURE VCRL + SZ 0 L18IN ABSRB UD L36MM CT-1 1/2 CIR VCP840D

## (undated) DEVICE — GLOVE SURG SZ 7 L12IN FNGR THK79MIL GRN LTX FREE

## (undated) DEVICE — CHLORAPREP 26ML ORANGE

## (undated) DEVICE — 3M™ MEDIPORE™ H SOFT CLOTH SURGICAL TAPE 2864, 4 INCH X 10 YARD (10CM X 9,14M), 12 ROLLS/CASE: Brand: 3M™ MEDIPORE™

## (undated) DEVICE — SPONGE LAP W18XL18IN WHT COT 4 PLY FLD STRUNG RADPQ DISP ST

## (undated) DEVICE — SAFESECURE,SECUREMENT,FOLEY CATH,STERILE: Brand: MEDLINE

## (undated) DEVICE — GLOVE,SURG,SENSICARE SLT,LF,PF,6.5: Brand: MEDLINE

## (undated) DEVICE — 9165 UNIVERSAL PATIENT PLATE: Brand: 3M™

## (undated) DEVICE — TELFA NON-ADHERENT ABSORBENT DRESSING: Brand: TELFA

## (undated) DEVICE — SUTURE VCRL + SZ 2-0 L27IN ABSRB CLR CT-1 1/2 CIR TAPERCUT VCP259H

## (undated) DEVICE — CANISTER, RIGID, 3000CC: Brand: MEDLINE INDUSTRIES, INC.